# Patient Record
Sex: FEMALE | Race: WHITE | Employment: OTHER | URBAN - METROPOLITAN AREA
[De-identification: names, ages, dates, MRNs, and addresses within clinical notes are randomized per-mention and may not be internally consistent; named-entity substitution may affect disease eponyms.]

---

## 2017-02-14 RX ORDER — TRIAMTERENE AND HYDROCHLOROTHIAZIDE 37.5; 25 MG/1; MG/1
TABLET ORAL
Qty: 90 TABLET | Refills: 3 | Status: SHIPPED | OUTPATIENT
Start: 2017-02-14 | End: 2018-03-23

## 2017-03-27 ENCOUNTER — OFFICE VISIT (OUTPATIENT)
Dept: INTERNAL MEDICINE CLINIC | Facility: CLINIC | Age: 67
End: 2017-03-27

## 2017-03-27 VITALS
WEIGHT: 167 LBS | DIASTOLIC BLOOD PRESSURE: 68 MMHG | SYSTOLIC BLOOD PRESSURE: 114 MMHG | TEMPERATURE: 98 F | BODY MASS INDEX: 31.94 KG/M2 | HEIGHT: 60.5 IN | HEART RATE: 64 BPM

## 2017-03-27 DIAGNOSIS — Z12.31 VISIT FOR SCREENING MAMMOGRAM: ICD-10-CM

## 2017-03-27 DIAGNOSIS — I10 ESSENTIAL HYPERTENSION: ICD-10-CM

## 2017-03-27 DIAGNOSIS — Z00.00 ANNUAL PHYSICAL EXAM: Primary | ICD-10-CM

## 2017-03-27 DIAGNOSIS — B35.1 FUNGAL INFECTION OF NAIL: ICD-10-CM

## 2017-03-27 PROCEDURE — 99396 PREV VISIT EST AGE 40-64: CPT | Performed by: INTERNAL MEDICINE

## 2017-03-27 NOTE — PROGRESS NOTES
Jez Kumar is a 61year old female who presents for     Annual physical and 7 month check    Feels good. Did a half marathon last year--run/walk combination. HTN:    Not checking home BPs. Tolerating Maxzide 37.5/25 mg daily.      Asks check o wheezing or dyspnea  Cardiac: No chest pain or palpitations  Gastrointestinal: No abdominal pain, vomiting, diarrhea or constipation, no blood in stool. Genitourinary:  No dysuria or blood in the urine  Dermatologic:  No rash or itching.   Thressa Fillers stool hemoccults to return.     Lab 8/9/16- cbc cmp tsh ua -results ok.  Lipids and Vit D were nl in 7/15. Ret in 6 months with cbc cmp tsh ua with reflex to culture. Patient expresses understanding of above issues and plan.          Current Outpatien

## 2017-03-28 ENCOUNTER — TELEPHONE (OUTPATIENT)
Dept: INTERNAL MEDICINE CLINIC | Facility: CLINIC | Age: 67
End: 2017-03-28

## 2017-03-28 NOTE — TELEPHONE ENCOUNTER
Erin faxed a PA for: Ciclopirox ph. # [phone]   Fax.  # [phone]    Placed in [address] [place] [address] folder   Routed to Rx

## 2017-04-03 NOTE — TELEPHONE ENCOUNTER
DENIAL rec'd for Ciclopirox  Form sent to be scanned (rec'd only 6 of 8 pages of the fax)  Tasked to [employer]

## 2017-08-08 PROCEDURE — 82270 OCCULT BLOOD FECES: CPT

## 2017-08-09 ENCOUNTER — TELEPHONE (OUTPATIENT)
Dept: INTERNAL MEDICINE CLINIC | Facility: CLINIC | Age: 67
End: 2017-08-09

## 2017-08-09 ENCOUNTER — APPOINTMENT (OUTPATIENT)
Dept: LAB | Age: 67
End: 2017-08-09
Attending: INTERNAL MEDICINE
Payer: COMMERCIAL

## 2017-08-09 DIAGNOSIS — Z00.00 ANNUAL PHYSICAL EXAM: ICD-10-CM

## 2017-08-09 LAB
HEMOCCULT STL QL: NEGATIVE

## 2017-08-10 ENCOUNTER — TELEPHONE (OUTPATIENT)
Dept: INTERNAL MEDICINE CLINIC | Facility: CLINIC | Age: 67
End: 2017-08-10

## 2017-08-10 NOTE — TELEPHONE ENCOUNTER
To nursing, please tell pt --results of stool hemoccults x3 are neg. (normal). (dated 8/8/17)  Thanks.

## 2017-08-16 ENCOUNTER — LAB ENCOUNTER (OUTPATIENT)
Dept: LAB | Facility: HOSPITAL | Age: 67
End: 2017-08-16
Attending: INTERNAL MEDICINE
Payer: COMMERCIAL

## 2017-08-16 ENCOUNTER — TELEPHONE (OUTPATIENT)
Dept: INTERNAL MEDICINE CLINIC | Facility: CLINIC | Age: 67
End: 2017-08-16

## 2017-08-16 DIAGNOSIS — Z00.00 ANNUAL PHYSICAL EXAM: ICD-10-CM

## 2017-08-16 DIAGNOSIS — I10 ESSENTIAL HYPERTENSION: ICD-10-CM

## 2017-08-16 LAB
ALBUMIN SERPL BCP-MCNC: 4.1 G/DL (ref 3.5–4.8)
ALBUMIN/GLOB SERPL: 1.5 {RATIO} (ref 1–2)
ALP SERPL-CCNC: 72 U/L (ref 32–100)
ALT SERPL-CCNC: 17 U/L (ref 14–54)
ANION GAP SERPL CALC-SCNC: 8 MMOL/L (ref 0–18)
AST SERPL-CCNC: 24 U/L (ref 15–41)
BASOPHILS # BLD: 0 K/UL (ref 0–0.2)
BASOPHILS NFR BLD: 1 %
BILIRUB SERPL-MCNC: 1.1 MG/DL (ref 0.3–1.2)
BILIRUB UR QL: NEGATIVE
BUN SERPL-MCNC: 16 MG/DL (ref 8–20)
BUN/CREAT SERPL: 17.8 (ref 10–20)
CALCIUM SERPL-MCNC: 9.7 MG/DL (ref 8.5–10.5)
CHLORIDE SERPL-SCNC: 103 MMOL/L (ref 95–110)
CO2 SERPL-SCNC: 29 MMOL/L (ref 22–32)
COLOR UR: YELLOW
CREAT SERPL-MCNC: 0.9 MG/DL (ref 0.5–1.5)
EOSINOPHIL # BLD: 0.1 K/UL (ref 0–0.7)
EOSINOPHIL NFR BLD: 4 %
ERYTHROCYTE [DISTWIDTH] IN BLOOD BY AUTOMATED COUNT: 13.4 % (ref 11–15)
GLOBULIN PLAS-MCNC: 2.8 G/DL (ref 2.5–3.7)
GLUCOSE SERPL-MCNC: 99 MG/DL (ref 70–99)
GLUCOSE UR-MCNC: NEGATIVE MG/DL
HCT VFR BLD AUTO: 40.9 % (ref 35–48)
HGB BLD-MCNC: 13.6 G/DL (ref 12–16)
HGB UR QL STRIP.AUTO: NEGATIVE
KETONES UR-MCNC: NEGATIVE MG/DL
LEUKOCYTE ESTERASE UR QL STRIP.AUTO: NEGATIVE
LYMPHOCYTES # BLD: 1.2 K/UL (ref 1–4)
LYMPHOCYTES NFR BLD: 31 %
MCH RBC QN AUTO: 31.4 PG (ref 27–32)
MCHC RBC AUTO-ENTMCNC: 33.2 G/DL (ref 32–37)
MCV RBC AUTO: 94.7 FL (ref 80–100)
MONOCYTES # BLD: 0.4 K/UL (ref 0–1)
MONOCYTES NFR BLD: 10 %
NEUTROPHILS # BLD AUTO: 2.2 K/UL (ref 1.8–7.7)
NEUTROPHILS NFR BLD: 55 %
NITRITE UR QL STRIP.AUTO: NEGATIVE
OSMOLALITY UR CALC.SUM OF ELEC: 291 MOSM/KG (ref 275–295)
PH UR: 5 [PH] (ref 5–8)
PLATELET # BLD AUTO: 253 K/UL (ref 140–400)
PMV BLD AUTO: 7.3 FL (ref 7.4–10.3)
POTASSIUM SERPL-SCNC: 3.5 MMOL/L (ref 3.3–5.1)
PROT SERPL-MCNC: 6.9 G/DL (ref 5.9–8.4)
PROT UR-MCNC: NEGATIVE MG/DL
RBC # BLD AUTO: 4.32 M/UL (ref 3.7–5.4)
SODIUM SERPL-SCNC: 140 MMOL/L (ref 136–144)
SP GR UR STRIP: 1.02 (ref 1–1.03)
TSH SERPL-ACNC: 2.85 UIU/ML (ref 0.45–5.33)
UROBILINOGEN UR STRIP-ACNC: <2
VIT C UR-MCNC: NEGATIVE MG/DL
WBC # BLD AUTO: 4 K/UL (ref 4–11)

## 2017-08-16 PROCEDURE — 81003 URINALYSIS AUTO W/O SCOPE: CPT | Performed by: INTERNAL MEDICINE

## 2017-08-16 PROCEDURE — 84443 ASSAY THYROID STIM HORMONE: CPT

## 2017-08-16 PROCEDURE — 85025 COMPLETE CBC W/AUTO DIFF WBC: CPT

## 2017-08-16 PROCEDURE — 36415 COLL VENOUS BLD VENIPUNCTURE: CPT

## 2017-08-16 PROCEDURE — 80053 COMPREHEN METABOLIC PANEL: CPT

## 2017-08-16 NOTE — TELEPHONE ENCOUNTER
Nursing, please tell patient lab done on 8/16/17–CBC CMP TSH UA–results are okay. See me 9/25/17 as planned. Thanks.

## 2017-08-21 ENCOUNTER — TELEPHONE (OUTPATIENT)
Dept: INTERNAL MEDICINE CLINIC | Facility: CLINIC | Age: 67
End: 2017-08-21

## 2017-08-21 ENCOUNTER — OFFICE VISIT (OUTPATIENT)
Dept: INTERNAL MEDICINE CLINIC | Facility: CLINIC | Age: 67
End: 2017-08-21

## 2017-08-21 VITALS
SYSTOLIC BLOOD PRESSURE: 136 MMHG | HEART RATE: 54 BPM | BODY MASS INDEX: 31.14 KG/M2 | OXYGEN SATURATION: 98 % | WEIGHT: 162.81 LBS | HEIGHT: 60.5 IN | DIASTOLIC BLOOD PRESSURE: 76 MMHG | TEMPERATURE: 99 F

## 2017-08-21 DIAGNOSIS — M79.672 LEFT FOOT PAIN: Primary | ICD-10-CM

## 2017-08-21 PROCEDURE — 99212 OFFICE O/P EST SF 10 MIN: CPT | Performed by: INTERNAL MEDICINE

## 2017-08-21 PROCEDURE — 99213 OFFICE O/P EST LOW 20 MIN: CPT | Performed by: INTERNAL MEDICINE

## 2017-08-21 NOTE — TELEPHONE ENCOUNTER
To managed care, I gave this pt a referral to podiatrist Dr. Gary Villegas for L foot pain. Can you please verify she is in her HMO plan so we can let pt know. Thanks.

## 2017-08-21 NOTE — PROGRESS NOTES
Oleg Kumari is a 61year old female who presents for     L foot problem. Pain in medial L heel when runs 8 miles. \"rammed\" the foot of ottoman betw toes #3 and 4 L foot 2 1/2 wks ago. Some pain base of 4th toe. Although is better.  Also felt vibra Right arm, Patient Position: Sitting, Cuff Size: adult)   Pulse 54   Temp 98.7 °F (37.1 °C) (Oral)   Ht 5' 0.5\" (1.537 m)   Wt 162 lb 12.8 oz (73.8 kg)   SpO2 98%   BMI 31.27 kg/m²       Wt Readings from Last 6 Encounters:  08/21/17 : 162 lb 12.8 oz (73.8

## 2017-08-22 NOTE — TELEPHONE ENCOUNTER
Ryan Wallis from [facility] called - pt has an appt on 8/24, and does need a referral. Please fax to F: [phone]

## 2017-08-31 ENCOUNTER — TELEPHONE (OUTPATIENT)
Dept: INTERNAL MEDICINE CLINIC | Facility: CLINIC | Age: 67
End: 2017-08-31

## 2017-08-31 DIAGNOSIS — M79.673 PAIN OF FOOT, UNSPECIFIED LATERALITY: Primary | ICD-10-CM

## 2017-08-31 NOTE — TELEPHONE ENCOUNTER
[phone]  Pt need additional referrals for Dr Orin Smith.  Pt has seen her 2 times already and has 1 left  To clinical

## 2017-08-31 NOTE — TELEPHONE ENCOUNTER
Referral placed. Patient notified of referral placement and that she will hear back from [facility] with authorization.

## 2017-09-06 ENCOUNTER — TELEPHONE (OUTPATIENT)
Dept: INTERNAL MEDICINE CLINIC | Facility: CLINIC | Age: 67
End: 2017-09-06

## 2017-09-06 DIAGNOSIS — L60.3 DYSTROPHIA UNGUIUM: Primary | ICD-10-CM

## 2017-09-06 NOTE — TELEPHONE ENCOUNTER
Needs referral for toe nail biopsy to be done by Dr hCasity Armendariz    Procedure code is  58477 -  Diagnosis code is  L60.3    [facility] @ Dr Johnathan Murillo office can be reached at   [phone] X [phone] - fax# [phone]

## 2017-09-06 NOTE — TELEPHONE ENCOUNTER
Spoke with Dr. Conrad Donnelly offce([facility]) explained it is there office responsibility to obtain prior authorization for procedures or surgery performed by Dr. Conrad Donnelly per [facility] contract. Referral will be canceled. Thank you, [facility].

## 2017-09-06 NOTE — TELEPHONE ENCOUNTER
I note managed care canceled toenail biopsy referral and notifed Dr. Nathalie Guardado office that they are required to do their own referrals for procedures.

## 2017-09-06 NOTE — TELEPHONE ENCOUNTER
To nursing, I signed referral for toenail bx Dr. Brad Smith. Dx dystrophia unguinum. 3 visits. Thanks.

## 2017-09-08 ENCOUNTER — HOSPITAL ENCOUNTER (OUTPATIENT)
Dept: MAMMOGRAPHY | Facility: HOSPITAL | Age: 67
Discharge: HOME | End: 2017-09-08
Attending: INTERNAL MEDICINE
Payer: COMMERCIAL

## 2017-09-08 DIAGNOSIS — Z12.31 VISIT FOR SCREENING MAMMOGRAM: ICD-10-CM

## 2017-09-08 PROCEDURE — 77067 SCR MAMMO BI INCL CAD: CPT | Performed by: INTERNAL MEDICINE

## 2017-09-25 ENCOUNTER — OFFICE VISIT (OUTPATIENT)
Dept: INTERNAL MEDICINE CLINIC | Facility: CLINIC | Age: 67
End: 2017-09-25

## 2017-09-25 VITALS
HEIGHT: 64 IN | BODY MASS INDEX: 27.66 KG/M2 | HEART RATE: 54 BPM | TEMPERATURE: 98 F | OXYGEN SATURATION: 99 % | SYSTOLIC BLOOD PRESSURE: 116 MMHG | DIASTOLIC BLOOD PRESSURE: 84 MMHG | WEIGHT: 162 LBS

## 2017-09-25 DIAGNOSIS — I10 ESSENTIAL HYPERTENSION: Primary | ICD-10-CM

## 2017-09-25 DIAGNOSIS — M85.80 OSTEOPENIA DETERMINED BY X-RAY: ICD-10-CM

## 2017-09-25 DIAGNOSIS — B35.1 FUNGAL INFECTION OF NAIL: ICD-10-CM

## 2017-09-25 PROCEDURE — 99212 OFFICE O/P EST SF 10 MIN: CPT | Performed by: INTERNAL MEDICINE

## 2017-09-25 PROCEDURE — 99214 OFFICE O/P EST MOD 30 MIN: CPT | Performed by: INTERNAL MEDICINE

## 2017-09-25 NOTE — PROGRESS NOTES
Philippe Mccullough is a 61year old female who presents for     6 mo check    Feels good. Is training for [address] [place] [address] to be 10/8/17. Saw Dr. Matute and to get orthotics --on order now. L foot pain is better. Able to train--ran 20 mi last wk end.     dyspnea  Cardiac: No chest pain or palpitations  Gastrointestinal: No abdominal pain, vomiting, diarrhea or constipation  Genitourinary:  No dysuria or blood in the urine  Dermatologic:  No rash or itching.       EXAM:   /84 (BP Location: Right arm, P issues and plan. Current Outpatient Prescriptions:  TRIAMTERENE-HCTZ 37.5-25 MG Oral Tab TAKE ONE TABLET BY MOUTH EVERY DAY Disp: 90 tablet Rfl: 3   Calcium Carbonate-Vitamin D (CALCIUM + D OR) Take 1 tablet by mouth daily.  Disp:  Rfl:          [address]

## 2017-10-03 ENCOUNTER — TELEPHONE (OUTPATIENT)
Dept: INTERNAL MEDICINE CLINIC | Facility: CLINIC | Age: 67
End: 2017-10-03

## 2017-10-03 NOTE — TELEPHONE ENCOUNTER
Pt needs the orders from 8/31 and 9/6 (11 visits total) for Dr. Karmen Beard, faxed to their office.       Tasked to Nursing

## 2017-12-11 ENCOUNTER — NURSE ONLY (OUTPATIENT)
Dept: INTERNAL MEDICINE CLINIC | Facility: CLINIC | Age: 67
End: 2017-12-11

## 2017-12-11 DIAGNOSIS — Z23 NEED FOR INFLUENZA VACCINATION: Primary | ICD-10-CM

## 2017-12-11 PROCEDURE — 90471 IMMUNIZATION ADMIN: CPT | Performed by: INTERNAL MEDICINE

## 2017-12-11 PROCEDURE — 90686 IIV4 VACC NO PRSV 0.5 ML IM: CPT | Performed by: INTERNAL MEDICINE

## 2017-12-11 NOTE — PROGRESS NOTES
Patient presents for Flu vaccine. Confirmed personal information with patient.  Verified reason of visit

## 2017-12-19 ENCOUNTER — OFFICE VISIT (OUTPATIENT)
Dept: INTERNAL MEDICINE CLINIC | Facility: CLINIC | Age: 67
End: 2017-12-19

## 2017-12-19 ENCOUNTER — TELEPHONE (OUTPATIENT)
Dept: INTERNAL MEDICINE CLINIC | Facility: CLINIC | Age: 67
End: 2017-12-19

## 2017-12-19 ENCOUNTER — HOSPITAL ENCOUNTER (OUTPATIENT)
Dept: GENERAL RADIOLOGY | Age: 67
Discharge: HOME | End: 2017-12-19
Attending: INTERNAL MEDICINE | Admitting: INTERNAL MEDICINE
Payer: MEDICARE

## 2017-12-19 VITALS
HEIGHT: 64 IN | HEART RATE: 60 BPM | SYSTOLIC BLOOD PRESSURE: 130 MMHG | BODY MASS INDEX: 28.17 KG/M2 | TEMPERATURE: 99 F | DIASTOLIC BLOOD PRESSURE: 80 MMHG | WEIGHT: 165 LBS

## 2017-12-19 DIAGNOSIS — M25.561 ACUTE PAIN OF RIGHT KNEE: Primary | ICD-10-CM

## 2017-12-19 DIAGNOSIS — M25.561 ACUTE PAIN OF RIGHT KNEE: ICD-10-CM

## 2017-12-19 PROCEDURE — 73560 X-RAY EXAM OF KNEE 1 OR 2: CPT | Performed by: INTERNAL MEDICINE

## 2017-12-19 PROCEDURE — G0463 HOSPITAL OUTPT CLINIC VISIT: HCPCS | Performed by: INTERNAL MEDICINE

## 2017-12-19 PROCEDURE — 99213 OFFICE O/P EST LOW 20 MIN: CPT | Performed by: INTERNAL MEDICINE

## 2017-12-19 NOTE — TELEPHONE ENCOUNTER
To nursing, please tell pt xray R knee xray today shows some narrowing of the joint space in central R knee and some fluid in the knee. Joint space narrowing can sometimes be seen in early arthritis.   Go ahead and see the orthopedic Dr. Odette Lancaster as we dis

## 2017-12-19 NOTE — PROGRESS NOTES
Konstantin Jere is a 74year old female who presents for     R knee pain  When running 2 wks ago felt a pulling in medial R knee. Aches in anterior R knee going down the stairs. No locking or giving out. Not needed to take advil or OTC pain med.    Saw  (37.2 °C) (Oral)   Ht 5' 4\" (1.626 m)   Wt 165 lb (74.8 kg)   BMI 28.32 kg/m²       Wt Readings from Last 6 Encounters:  12/19/17 : 165 lb (74.8 kg)  09/25/17 : 162 lb (73.5 kg)  08/21/17 : 162 lb 12.8 oz (73.8 kg)  03/27/17 : 167 lb (75.8 kg)  08/16/16 :

## 2017-12-19 NOTE — TELEPHONE ENCOUNTER
Patient had her x-rays done today downstairs. She has an appointment with Dr. Danae Gonzales on 12/26.     Patient needs a referral.

## 2018-01-15 ENCOUNTER — TELEPHONE (OUTPATIENT)
Dept: INTERNAL MEDICINE CLINIC | Facility: CLINIC | Age: 68
End: 2018-01-15

## 2018-01-15 NOTE — TELEPHONE ENCOUNTER
Pt. Is going on a mission trip and wants to know when was her last Tetanus shot  Ph. # [phone]   Routed to clinical

## 2018-03-23 RX ORDER — TRIAMTERENE AND HYDROCHLOROTHIAZIDE 37.5; 25 MG/1; MG/1
TABLET ORAL
Qty: 90 TABLET | Refills: 3 | Status: SHIPPED | OUTPATIENT
Start: 2018-03-23 | End: 2018-10-22

## 2018-04-03 ENCOUNTER — OFFICE VISIT (OUTPATIENT)
Dept: INTERNAL MEDICINE CLINIC | Facility: CLINIC | Age: 68
End: 2018-04-03

## 2018-04-03 VITALS
WEIGHT: 163 LBS | TEMPERATURE: 98 F | HEIGHT: 64 IN | DIASTOLIC BLOOD PRESSURE: 76 MMHG | SYSTOLIC BLOOD PRESSURE: 120 MMHG | OXYGEN SATURATION: 98 % | HEART RATE: 55 BPM | BODY MASS INDEX: 27.83 KG/M2

## 2018-04-03 DIAGNOSIS — Z85.3 HISTORY OF BREAST CANCER: ICD-10-CM

## 2018-04-03 DIAGNOSIS — I10 ESSENTIAL HYPERTENSION: Primary | ICD-10-CM

## 2018-04-03 DIAGNOSIS — M17.11 PRIMARY OSTEOARTHRITIS OF RIGHT KNEE: ICD-10-CM

## 2018-04-03 PROCEDURE — G0009 ADMIN PNEUMOCOCCAL VACCINE: HCPCS | Performed by: INTERNAL MEDICINE

## 2018-04-03 PROCEDURE — 90670 PCV13 VACCINE IM: CPT | Performed by: INTERNAL MEDICINE

## 2018-04-03 PROCEDURE — 99214 OFFICE O/P EST MOD 30 MIN: CPT | Performed by: INTERNAL MEDICINE

## 2018-04-03 PROCEDURE — G0463 HOSPITAL OUTPT CLINIC VISIT: HCPCS | Performed by: INTERNAL MEDICINE

## 2018-04-03 NOTE — PROGRESS NOTES
Naty Xiong is a 74year old female who presents for     6 mo check      Feels good. Was in [facility] for 2 mo-returned 1 mo ago. R knee pain of 12/19/17 visit is better. 12/19/17-xray R knee--narrowing of medial joint space and small joint effusion. systems:  Constitutional:  No fever, loss of appetite or unintentional weight loss  Respiratory: No cough, wheezing or dyspnea  Cardiac: No chest pain or palpitations  Gastrointestinal: No abdominal pain, vomiting, diarrhea or constipation  Dermatologic: ok.     Ret in 6 mo with cbc cmp tsh lipid ua w reflex. Medicare annual then.      Patient expresses understanding of above issues and plan.        - CBC WITH DIFFERENTIAL WITH PLATELET; Future  - COMP METABOLIC PANEL (14); Future  - LIPID PANEL;  Future  -

## 2018-09-10 ENCOUNTER — OFFICE VISIT (OUTPATIENT)
Dept: INTERNAL MEDICINE CLINIC | Facility: CLINIC | Age: 68
End: 2018-09-10
Payer: MEDICARE

## 2018-09-10 ENCOUNTER — TELEPHONE (OUTPATIENT)
Dept: INTERNAL MEDICINE CLINIC | Facility: CLINIC | Age: 68
End: 2018-09-10

## 2018-09-10 VITALS
WEIGHT: 152.5 LBS | TEMPERATURE: 98 F | HEIGHT: 65 IN | BODY MASS INDEX: 25.41 KG/M2 | SYSTOLIC BLOOD PRESSURE: 110 MMHG | HEART RATE: 49 BPM | DIASTOLIC BLOOD PRESSURE: 76 MMHG | OXYGEN SATURATION: 99 %

## 2018-09-10 DIAGNOSIS — H57.89 PERIORBITAL SWELLING: Primary | ICD-10-CM

## 2018-09-10 PROCEDURE — G0463 HOSPITAL OUTPT CLINIC VISIT: HCPCS | Performed by: INTERNAL MEDICINE

## 2018-09-10 PROCEDURE — 99213 OFFICE O/P EST LOW 20 MIN: CPT | Performed by: INTERNAL MEDICINE

## 2018-09-10 RX ORDER — METHYLPREDNISOLONE 4 MG/1
TABLET ORAL
Qty: 1 KIT | Refills: 0 | Status: SHIPPED | OUTPATIENT
Start: 2018-09-10 | End: 2018-10-22 | Stop reason: ALTCHOICE

## 2018-09-10 NOTE — TELEPHONE ENCOUNTER
Called patient who wanted to know how her Medrol dose pack - she read then to take 3 tablets now and 3 before bedtime

## 2018-09-10 NOTE — PROGRESS NOTES
Oleg Kumari is a 74year old female who presents for     R eye swelling. Woke up yesterday with swelling under R eye. pt feels this is related to an insect bite near her R eye. Applied warm compresses -a hot paper towel--yest and today.    \"It h status: Never Smoker      Smokeless tobacco: Never Used    Alcohol use: Yes      Comment: wine, rarely     Drug use: No         Allergies:  No Known Allergies    Review of systems:  Constitutional:  Has loss of wt w wt watchers.    Heart rate today is 49-pt

## 2018-09-17 ENCOUNTER — TELEPHONE (OUTPATIENT)
Dept: INTERNAL MEDICINE CLINIC | Facility: CLINIC | Age: 68
End: 2018-09-17

## 2018-09-17 DIAGNOSIS — Z12.31 VISIT FOR SCREENING MAMMOGRAM: Primary | ICD-10-CM

## 2018-10-18 ENCOUNTER — HOSPITAL ENCOUNTER (OUTPATIENT)
Dept: MAMMOGRAPHY | Facility: HOSPITAL | Age: 68
Discharge: HOME | End: 2018-10-18
Attending: INTERNAL MEDICINE
Payer: COMMERCIAL

## 2018-10-18 DIAGNOSIS — Z12.31 VISIT FOR SCREENING MAMMOGRAM: ICD-10-CM

## 2018-10-18 PROCEDURE — 77067 SCR MAMMO BI INCL CAD: CPT | Performed by: INTERNAL MEDICINE

## 2018-10-18 PROCEDURE — 77063 BREAST TOMOSYNTHESIS BI: CPT | Performed by: INTERNAL MEDICINE

## 2018-10-19 ENCOUNTER — LAB ENCOUNTER (OUTPATIENT)
Dept: LAB | Facility: HOSPITAL | Age: 68
End: 2018-10-19
Attending: INTERNAL MEDICINE
Payer: COMMERCIAL

## 2018-10-19 DIAGNOSIS — I10 ESSENTIAL HYPERTENSION: ICD-10-CM

## 2018-10-19 PROCEDURE — 80053 COMPREHEN METABOLIC PANEL: CPT

## 2018-10-19 PROCEDURE — 87086 URINE CULTURE/COLONY COUNT: CPT | Performed by: INTERNAL MEDICINE

## 2018-10-19 PROCEDURE — 84443 ASSAY THYROID STIM HORMONE: CPT

## 2018-10-19 PROCEDURE — 81001 URINALYSIS AUTO W/SCOPE: CPT | Performed by: INTERNAL MEDICINE

## 2018-10-19 PROCEDURE — 36415 COLL VENOUS BLD VENIPUNCTURE: CPT

## 2018-10-19 PROCEDURE — 85025 COMPLETE CBC W/AUTO DIFF WBC: CPT

## 2018-10-19 PROCEDURE — 80061 LIPID PANEL: CPT

## 2018-10-22 ENCOUNTER — APPOINTMENT (OUTPATIENT)
Dept: LAB | Age: 68
End: 2018-10-22
Attending: INTERNAL MEDICINE
Payer: COMMERCIAL

## 2018-10-22 ENCOUNTER — OFFICE VISIT (OUTPATIENT)
Dept: INTERNAL MEDICINE CLINIC | Facility: CLINIC | Age: 68
End: 2018-10-22
Payer: MEDICARE

## 2018-10-22 VITALS
WEIGHT: 152 LBS | TEMPERATURE: 98 F | SYSTOLIC BLOOD PRESSURE: 130 MMHG | DIASTOLIC BLOOD PRESSURE: 80 MMHG | HEART RATE: 57 BPM | HEIGHT: 66 IN | OXYGEN SATURATION: 100 % | BODY MASS INDEX: 24.43 KG/M2

## 2018-10-22 DIAGNOSIS — K62.5 RECTAL BLEEDING: ICD-10-CM

## 2018-10-22 DIAGNOSIS — I10 ESSENTIAL HYPERTENSION: ICD-10-CM

## 2018-10-22 DIAGNOSIS — R82.90 ABNORMAL URINALYSIS: ICD-10-CM

## 2018-10-22 DIAGNOSIS — Z00.00 WELCOME TO MEDICARE PREVENTIVE VISIT: Primary | ICD-10-CM

## 2018-10-22 DIAGNOSIS — Z85.3 HISTORY OF BREAST CANCER: ICD-10-CM

## 2018-10-22 DIAGNOSIS — L98.9 SKIN LESION: ICD-10-CM

## 2018-10-22 DIAGNOSIS — M17.11 PRIMARY OSTEOARTHRITIS OF RIGHT KNEE: ICD-10-CM

## 2018-10-22 PROCEDURE — 99213 OFFICE O/P EST LOW 20 MIN: CPT | Performed by: INTERNAL MEDICINE

## 2018-10-22 PROCEDURE — 81003 URINALYSIS AUTO W/O SCOPE: CPT | Performed by: INTERNAL MEDICINE

## 2018-10-22 PROCEDURE — 90653 IIV ADJUVANT VACCINE IM: CPT | Performed by: INTERNAL MEDICINE

## 2018-10-22 PROCEDURE — G0438 PPPS, INITIAL VISIT: HCPCS | Performed by: INTERNAL MEDICINE

## 2018-10-22 PROCEDURE — G0463 HOSPITAL OUTPT CLINIC VISIT: HCPCS | Performed by: INTERNAL MEDICINE

## 2018-10-22 PROCEDURE — G0008 ADMIN INFLUENZA VIRUS VAC: HCPCS | Performed by: INTERNAL MEDICINE

## 2018-10-22 RX ORDER — TRIAMTERENE AND HYDROCHLOROTHIAZIDE 37.5; 25 MG/1; MG/1
1 TABLET ORAL
Qty: 90 TABLET | Refills: 3 | Status: SHIPPED | OUTPATIENT
Start: 2018-10-22 | End: 2020-01-02

## 2018-10-22 NOTE — PROGRESS NOTES
Neo Sandoval is a 74year old female who presents for     6 mo check and welcome to medicare     Feels good. Mel-orbital swelling of 9/10/18 visit resolved w medrol dose pack.    Finished half marathon in [place] in 9/2018.      R knee pain of 12/2017 (cause of death)   • Cancer Brother 64        multiple myeloma   • Other (Other) Brother         Sleep apnea   • Breast Cancer Self         42      Social History:   Social History    Tobacco Use      Smoking status: Never Smoker      Smokeless tobacco: Ne daily.      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 Saw Dr Jyoti Stewart. .      History of breast cancer-Hx of L breast cancer. Mammo ok 9/8/17--pt had mammo isabel 10/18/18--results still pending.  Kyung Every you describe your daily physical activity?: Moderate    How would you describe your current health state?: Good    How do you maintain positive mental well-being?: Visiting Family; Visiting Friends    If you are a male age 38-65 or a female age 47-67, do yo Correct    Recall \"Flag\": Correct    Recall \"Tree\": Correct      Tyrone Brown's SCREENING SCHEDULE   .    PREVENTATIVE SERVICES  INDICATIONS AND SCHEDULE Internal Lab or Procedure External Lab or Procedure   Diabetes Screening      HbgA1C   Annually Hepatitis B No orders found for this or any previous visit. Update Immunization Activity if applicable    Tetanus No orders found for this or any previous visit.  Update Immunization Activity if applicable    Zoster (Not covered by Medicare Part B) No order

## 2018-10-23 ENCOUNTER — TELEPHONE (OUTPATIENT)
Dept: INTERNAL MEDICINE CLINIC | Facility: CLINIC | Age: 68
End: 2018-10-23

## 2018-10-23 DIAGNOSIS — K62.5 RECTAL BLEEDING: Primary | ICD-10-CM

## 2018-10-23 DIAGNOSIS — R82.90 ABNORMAL URINALYSIS: Primary | ICD-10-CM

## 2018-10-23 DIAGNOSIS — R04.2 HEMOPTYSIS: Primary | ICD-10-CM

## 2018-10-23 NOTE — TELEPHONE ENCOUNTER
To nursing, referral reentered is just gastroenterology. I did not know you could enter a referral without the specific doctors name. PIs let patient know. Thanks.     1. Rectal bleeding  - GASTRO - INTERNAL

## 2018-10-23 NOTE — TELEPHONE ENCOUNTER
To Dr. Ramírez Cons - per mammography: Mammography is read, for some reason not crossing over. They are faxing copy now to your attention, Jake Wills will place on your desk when it arrives.

## 2018-10-23 NOTE — TELEPHONE ENCOUNTER
To Dr. Kenisha Cuevas - see below. Spoke with Dr. Leigh Thompson office - new referral needed for \"Gastroenterology\" - if not to a specific physician - they have 16 Drs.

## 2018-10-23 NOTE — TELEPHONE ENCOUNTER
To nursing, please tell patient urinalysis 10/22/18–results are normal.  I would repeat the urinalysis again in 1 month just to be sure there is not an intermittent infection –in view of abnormal urinalysis on 10/19/18. Order entered. Thanks.       - URIN

## 2018-10-23 NOTE — TELEPHONE ENCOUNTER
Pt called to schedule an riley with Dr Marisela Colunga, the referral Dr Jazzmine Newton gave pt said Dr Marisela Colunga or assoc  Pt can be seen until 12/13 she wants to get in sooner, pt is requesting a referral for another assoc, they told pt she needs this from Dr HARPER  Please call pt when c

## 2018-10-23 NOTE — TELEPHONE ENCOUNTER
To nursing, patient had mammogram 10/18/18. The result is still not been interpreted or released. Please call mammography and see if they can expedite this because the patient is asking for results.   [facility] RN called yesterday but still no results have bee

## 2018-10-26 DIAGNOSIS — D22.9 ATYPICAL MOLE: Primary | ICD-10-CM

## 2018-10-26 NOTE — TELEPHONE ENCOUNTER
Noted. Again attempted to reach out to the mammography office; left message requesting report be faxed to Dr. Ricardo Freeman today. Routed to Dr. Judson Solis as an Levell Moulds.

## 2018-10-26 NOTE — TELEPHONE ENCOUNTER
To nursing, please tell patient screening mammogram ([place] 2D + 3D screening) done 10/18/18–results are okay. Thanks. Note to self–Cheryl got the results faxed from [address] is not \"crossover\" in the system for her to be in [facility].   Faxed report is s

## 2018-10-26 NOTE — TELEPHONE ENCOUNTER
Discussed maria esther Cortez she called pt today with mammogram results, pt asked for   1)referral to Derm Dr Sol Rock to check moles--referral entered. 2) gets hard mucus w blood in mouth size of a nickel--happened 3 times in last mo.  Also occurred in 2012--saw

## 2018-10-26 NOTE — TELEPHONE ENCOUNTER
Called patient with Dr. Baudilio Stoner message. Patient also asked for referral to derm for moles and stated she has spit up blood lately.  Dr. Baudilio Stoner ordered CXR and ENT referral.

## 2018-10-31 ENCOUNTER — TELEPHONE (OUTPATIENT)
Dept: INTERNAL MEDICINE CLINIC | Facility: CLINIC | Age: 68
End: 2018-10-31

## 2018-10-31 ENCOUNTER — HOSPITAL ENCOUNTER (OUTPATIENT)
Dept: GENERAL RADIOLOGY | Age: 68
Discharge: HOME | End: 2018-10-31
Attending: INTERNAL MEDICINE
Payer: COMMERCIAL

## 2018-10-31 DIAGNOSIS — R04.2 HEMOPTYSIS: ICD-10-CM

## 2018-10-31 PROCEDURE — 71046 X-RAY EXAM CHEST 2 VIEWS: CPT | Performed by: INTERNAL MEDICINE

## 2018-10-31 NOTE — TELEPHONE ENCOUNTER
To nursing, please tell patient chest x-ray 10/31/18–results are okay. Go ahead and see Dr. Mora Menchaca tomorrow as planned for the material with blood that she has spit out. Thanks.

## 2018-11-01 ENCOUNTER — OFFICE VISIT (OUTPATIENT)
Dept: OTOLARYNGOLOGY | Facility: CLINIC | Age: 68
End: 2018-11-01
Payer: MEDICARE

## 2018-11-01 VITALS
HEIGHT: 65.5 IN | BODY MASS INDEX: 24.69 KG/M2 | DIASTOLIC BLOOD PRESSURE: 82 MMHG | TEMPERATURE: 99 F | WEIGHT: 150 LBS | SYSTOLIC BLOOD PRESSURE: 129 MMHG

## 2018-11-01 DIAGNOSIS — R04.2 HEMOPTYSIS: Primary | ICD-10-CM

## 2018-11-01 PROCEDURE — 31575 DIAGNOSTIC LARYNGOSCOPY: CPT | Performed by: OTOLARYNGOLOGY

## 2018-11-01 PROCEDURE — 99203 OFFICE O/P NEW LOW 30 MIN: CPT | Performed by: OTOLARYNGOLOGY

## 2018-11-01 NOTE — PROGRESS NOTES
Oleg Kumari is a 79year old female. Patient presents with:  Throat Problem: pt noticed red and yellow discharge from throat, 4 times in the last 1.5 months     HPI:   She experiences episodes of material light mucus with a spot of redness within it. Normal Overall appearance - Normal.   Head/Face Normal Facial features - Normal. Eyebrows - Normal. Skull - Normal.   Oral/Oropharynx Normal Lips - Normal, Tonsils - Normal, Tongue - Normal    Nasal Normal External nose - Normal. Nasal septum - Normal, Tur appearance. There is no airway obstruction. General comments:     ASSESSMENT AND PLAN:   1. Hemoptysis  She has been experiencing some hemoptysis. I do not see anything in the hypopharynx or larynx but there is an area of irritation in the nasopharynx.

## 2018-12-20 ENCOUNTER — TELEPHONE (OUTPATIENT)
Dept: INTERNAL MEDICINE CLINIC | Facility: CLINIC | Age: 68
End: 2018-12-20

## 2018-12-20 ENCOUNTER — APPOINTMENT (OUTPATIENT)
Dept: LAB | Facility: HOSPITAL | Age: 68
End: 2018-12-20
Attending: INTERNAL MEDICINE
Payer: MEDICARE

## 2018-12-20 PROCEDURE — 81003 URINALYSIS AUTO W/O SCOPE: CPT | Performed by: INTERNAL MEDICINE

## 2019-05-15 ENCOUNTER — OFFICE VISIT (OUTPATIENT)
Dept: INTERNAL MEDICINE CLINIC | Facility: CLINIC | Age: 69
End: 2019-05-15
Payer: COMMERCIAL

## 2019-05-15 VITALS
HEIGHT: 66 IN | DIASTOLIC BLOOD PRESSURE: 72 MMHG | HEART RATE: 60 BPM | WEIGHT: 152 LBS | BODY MASS INDEX: 24.43 KG/M2 | SYSTOLIC BLOOD PRESSURE: 132 MMHG | TEMPERATURE: 99 F

## 2019-05-15 DIAGNOSIS — R04.2 HEMOPTYSIS: ICD-10-CM

## 2019-05-15 DIAGNOSIS — I10 ESSENTIAL HYPERTENSION: Primary | ICD-10-CM

## 2019-05-15 DIAGNOSIS — Z85.3 HISTORY OF BREAST CANCER: ICD-10-CM

## 2019-05-15 PROCEDURE — 99214 OFFICE O/P EST MOD 30 MIN: CPT | Performed by: INTERNAL MEDICINE

## 2019-05-15 PROCEDURE — G0463 HOSPITAL OUTPT CLINIC VISIT: HCPCS | Performed by: INTERNAL MEDICINE

## 2019-05-15 NOTE — PROGRESS NOTES
Griffin Lang is a 79year old female who presents for     6 mo check      Feels good.   Returned 2 wks ago from winter in [place]. Since back from [place], feels allergies sx, stuffy nose, sneezing. She will try flonase. HTN: Not checking home BPs.  Toleratin LUMPECTOMY LEFT     • RADIATION LEFT        Family History   Problem Relation Age of Onset   • Alcohol and Other Disorders Associated Father         Alcoholism   • Cancer Father         Cancer-throat-- age 64 (cause of death)   • Depression Fat for f/u as he recom.  Pt expressed understanding.       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 Saw Dr Victoria Aragon.     History L breast cancer-mammo isabel 10/18/18--ok.     Osteopenia--Wt bearing exercises an

## 2019-08-09 ENCOUNTER — OFFICE VISIT (OUTPATIENT)
Dept: INTERNAL MEDICINE CLINIC | Facility: CLINIC | Age: 69
End: 2019-08-09
Payer: COMMERCIAL

## 2019-08-09 VITALS
TEMPERATURE: 98 F | BODY MASS INDEX: 25.07 KG/M2 | SYSTOLIC BLOOD PRESSURE: 126 MMHG | HEART RATE: 60 BPM | HEIGHT: 66 IN | DIASTOLIC BLOOD PRESSURE: 76 MMHG | WEIGHT: 156 LBS

## 2019-08-09 DIAGNOSIS — J39.2 THROAT IRRITATION: ICD-10-CM

## 2019-08-09 DIAGNOSIS — Z12.31 VISIT FOR SCREENING MAMMOGRAM: Primary | ICD-10-CM

## 2019-08-09 PROCEDURE — G0463 HOSPITAL OUTPT CLINIC VISIT: HCPCS | Performed by: INTERNAL MEDICINE

## 2019-08-09 PROCEDURE — 99213 OFFICE O/P EST LOW 20 MIN: CPT | Performed by: INTERNAL MEDICINE

## 2019-08-09 NOTE — PROGRESS NOTES
Baron Hernandez is a 79year old female who presents with     Sore throat    Onset: 4 days ago  Started with: scratchy sore throat. \"crud in my tonsils with stones all over them. \"  sneezing  Associated symptoms: no nasal drainage, has post nasal drip, Alcohol use: Yes      Comment: wine, rarely     Drug use: No         Allergies:  No Known Allergies    EXAM:   /76   Pulse 60   Temp 98.3 °F (36.8 °C) (Oral)   Ht 5' 6\" (1.676 m)   Wt 156 lb (70.8 kg)   BMI 25.18 kg/m²   Wt Readings from Last 6 [place]

## 2019-08-10 ENCOUNTER — OFFICE VISIT (OUTPATIENT)
Dept: OTOLARYNGOLOGY | Facility: CLINIC | Age: 69
End: 2019-08-10
Payer: COMMERCIAL

## 2019-08-10 VITALS
BODY MASS INDEX: 25.07 KG/M2 | SYSTOLIC BLOOD PRESSURE: 128 MMHG | WEIGHT: 156 LBS | TEMPERATURE: 98 F | DIASTOLIC BLOOD PRESSURE: 70 MMHG | HEIGHT: 66 IN

## 2019-08-10 DIAGNOSIS — R04.2 HEMOPTYSIS: Primary | ICD-10-CM

## 2019-08-10 PROCEDURE — 99214 OFFICE O/P EST MOD 30 MIN: CPT | Performed by: OTOLARYNGOLOGY

## 2019-08-10 PROCEDURE — 31575 DIAGNOSTIC LARYNGOSCOPY: CPT | Performed by: OTOLARYNGOLOGY

## 2019-08-10 PROCEDURE — G0463 HOSPITAL OUTPT CLINIC VISIT: HCPCS | Performed by: OTOLARYNGOLOGY

## 2019-08-10 RX ORDER — AZELASTINE 1 MG/ML
2 SPRAY, METERED NASAL 2 TIMES DAILY
Qty: 1 BOTTLE | Refills: 0 | Status: SHIPPED | OUTPATIENT
Start: 2019-08-10 | End: 2019-11-15

## 2019-08-10 RX ORDER — MONTELUKAST SODIUM 10 MG/1
10 TABLET ORAL NIGHTLY
Qty: 30 TABLET | Refills: 3 | Status: SHIPPED | OUTPATIENT
Start: 2019-08-10 | End: 2019-11-15

## 2019-08-10 RX ORDER — LORATADINE 10 MG/1
10 TABLET ORAL DAILY
Qty: 30 TABLET | Refills: 3 | Status: SHIPPED | OUTPATIENT
Start: 2019-08-10 | End: 2019-11-15

## 2019-08-12 ENCOUNTER — TELEPHONE (OUTPATIENT)
Dept: INTERNAL MEDICINE CLINIC | Facility: CLINIC | Age: 69
End: 2019-08-12

## 2019-08-12 NOTE — TELEPHONE ENCOUNTER
To nursing, please tell patient throat culture done 8/9/19–results are negative. I see that she saw Dr. Ray [place] 8/10/19 and he prescribed montelukast.    Thanks.

## 2019-09-25 ENCOUNTER — MA CHART PREP (OUTPATIENT)
Dept: FAMILY MEDICINE CLINIC | Facility: CLINIC | Age: 69
End: 2019-09-25

## 2019-09-25 PROBLEM — M85.80 OSTEOPENIA: Status: ACTIVE | Noted: 2019-09-25

## 2019-10-17 ENCOUNTER — NURSE ONLY (OUTPATIENT)
Dept: INTERNAL MEDICINE CLINIC | Facility: CLINIC | Age: 69
End: 2019-10-17
Payer: COMMERCIAL

## 2019-10-17 DIAGNOSIS — Z23 NEED FOR INFLUENZA VACCINATION: Primary | ICD-10-CM

## 2019-10-17 PROCEDURE — 90662 IIV NO PRSV INCREASED AG IM: CPT | Performed by: INTERNAL MEDICINE

## 2019-10-17 PROCEDURE — G0008 ADMIN INFLUENZA VIRUS VAC: HCPCS | Performed by: INTERNAL MEDICINE

## 2019-10-17 NOTE — PROGRESS NOTES
Patient present for Influenza vaccine. Patient's name and date of birth verified. Influenza consent form completed and signed by patient. Injection well tolerated to right deltoid with no adverse reactions noted.

## 2019-11-12 ENCOUNTER — HOSPITAL ENCOUNTER (OUTPATIENT)
Dept: MAMMOGRAPHY | Facility: HOSPITAL | Age: 69
Discharge: HOME | End: 2019-11-12
Attending: INTERNAL MEDICINE
Payer: MEDICARE

## 2019-11-12 DIAGNOSIS — Z12.31 VISIT FOR SCREENING MAMMOGRAM: ICD-10-CM

## 2019-11-12 PROCEDURE — 77067 SCR MAMMO BI INCL CAD: CPT | Performed by: INTERNAL MEDICINE

## 2019-11-12 PROCEDURE — 77063 BREAST TOMOSYNTHESIS BI: CPT | Performed by: INTERNAL MEDICINE

## 2019-11-15 ENCOUNTER — OFFICE VISIT (OUTPATIENT)
Dept: INTERNAL MEDICINE CLINIC | Facility: CLINIC | Age: 69
End: 2019-11-15
Payer: COMMERCIAL

## 2019-11-15 VITALS
BODY MASS INDEX: 25.07 KG/M2 | HEART RATE: 68 BPM | DIASTOLIC BLOOD PRESSURE: 70 MMHG | WEIGHT: 156 LBS | HEIGHT: 66 IN | TEMPERATURE: 98 F | SYSTOLIC BLOOD PRESSURE: 120 MMHG

## 2019-11-15 DIAGNOSIS — M17.11 PRIMARY OSTEOARTHRITIS OF RIGHT KNEE: ICD-10-CM

## 2019-11-15 DIAGNOSIS — Z87.09 HISTORY OF HEMOPTYSIS: ICD-10-CM

## 2019-11-15 DIAGNOSIS — Z85.3 HISTORY OF BREAST CANCER: ICD-10-CM

## 2019-11-15 DIAGNOSIS — I10 ESSENTIAL HYPERTENSION: ICD-10-CM

## 2019-11-15 DIAGNOSIS — Z00.00 MEDICARE ANNUAL WELLNESS VISIT, INITIAL: Primary | ICD-10-CM

## 2019-11-15 DIAGNOSIS — M85.80 OSTEOPENIA, UNSPECIFIED LOCATION: ICD-10-CM

## 2019-11-15 PROCEDURE — 90732 PPSV23 VACC 2 YRS+ SUBQ/IM: CPT | Performed by: INTERNAL MEDICINE

## 2019-11-15 PROCEDURE — G0439 PPPS, SUBSEQ VISIT: HCPCS | Performed by: INTERNAL MEDICINE

## 2019-11-15 PROCEDURE — 96160 PT-FOCUSED HLTH RISK ASSMT: CPT | Performed by: INTERNAL MEDICINE

## 2019-11-15 PROCEDURE — G0009 ADMIN PNEUMOCOCCAL VACCINE: HCPCS | Performed by: INTERNAL MEDICINE

## 2019-11-15 PROCEDURE — 99397 PER PM REEVAL EST PAT 65+ YR: CPT | Performed by: INTERNAL MEDICINE

## 2019-11-15 NOTE — PROGRESS NOTES
Serena Baltazar is a 81year old female who presents for     6 mo check and medicare annual     Feels good.   Just returned from trip to [place]. HTN: home BPs 120/70 range. Tolerating Maxzide 37.5/25 mg daily. R knee doing ok.  Did a half marathon th Yes      Comment: wine, rarely     Drug use: No         Allergies:  No Known Allergies    Review of systems:  Constitutional:  No fever, loss of appetite or unintentional weight loss  Respiratory: No cough, wheezing or dyspnea  Cardiac: No chest pain or pa Vaccines:-Zostavax 8/14. shingrix disc again 11/15/19 visit. Tdap 10/13. Flu shot 10/17/19. Prevnar 4/3/18,  Pmvx 11/15/19.   colonoscopy 3/5/19 Dr. Manisha Ma and hemorrhoids.     Lab 10/19/18-cbc cmp tsh lipid ua wreflex--ua 63 wbc's 6 rbc's No    Vision Problems? : No    Difficulty walking?: No    Difficulty dressing or bathing?: No    Problems with daily activities? : No    Memory Problems?: No      Fall/Risk Assessment          Have you fallen in the last 12 months?: 0-No Date Value   08/08/2017 Negative   08/08/2017 Negative   08/08/2017 Negative    No flowsheet data found.     Glaucoma Screening      Ophthalmology Visit Annually     Bone Density Screening      Dexascan Every two years Last Dexa Scan:   XR DEXA BONE DENSI Annually No results found for: A1C No flowsheet data found. Creat/alb ratio  Annually      LDL  Annually LDL Cholesterol (mg/dL)   Date Value   10/19/2018 112 (H)   07/09/2015 94    No flowsheet data found.      Dilated Eye exam  Annually No flowsheet da

## 2019-11-29 ENCOUNTER — TELEPHONE (OUTPATIENT)
Dept: INTERNAL MEDICINE CLINIC | Facility: CLINIC | Age: 69
End: 2019-11-29

## 2019-11-29 ENCOUNTER — LAB ENCOUNTER (OUTPATIENT)
Dept: LAB | Facility: HOSPITAL | Age: 69
End: 2019-11-29
Attending: INTERNAL MEDICINE
Payer: MEDICARE

## 2019-11-29 DIAGNOSIS — I10 ESSENTIAL HYPERTENSION: ICD-10-CM

## 2019-11-29 PROCEDURE — 80061 LIPID PANEL: CPT

## 2019-11-29 PROCEDURE — 85025 COMPLETE CBC W/AUTO DIFF WBC: CPT

## 2019-11-29 PROCEDURE — 36415 COLL VENOUS BLD VENIPUNCTURE: CPT

## 2019-11-29 PROCEDURE — 84443 ASSAY THYROID STIM HORMONE: CPT

## 2019-11-29 PROCEDURE — 80053 COMPREHEN METABOLIC PANEL: CPT

## 2019-11-29 NOTE — TELEPHONE ENCOUNTER
To nursing, please tell pt blood and urine tests done today--results are ok. Thanks. Note to self--  11/29/19-cbc cmp tsh lipid ua --results ok.

## 2020-01-02 RX ORDER — TRIAMTERENE AND HYDROCHLOROTHIAZIDE 37.5; 25 MG/1; MG/1
TABLET ORAL
Qty: 90 TABLET | Refills: 3 | Status: SHIPPED | OUTPATIENT
Start: 2020-01-02 | End: 2021-01-25

## 2020-06-05 ENCOUNTER — OFFICE VISIT (OUTPATIENT)
Dept: INTERNAL MEDICINE CLINIC | Facility: CLINIC | Age: 70
End: 2020-06-05
Payer: COMMERCIAL

## 2020-06-05 VITALS
SYSTOLIC BLOOD PRESSURE: 138 MMHG | DIASTOLIC BLOOD PRESSURE: 68 MMHG | HEIGHT: 66 IN | TEMPERATURE: 99 F | BODY MASS INDEX: 24.27 KG/M2 | WEIGHT: 151 LBS | HEART RATE: 68 BPM

## 2020-06-05 DIAGNOSIS — R04.2 HEMOPTYSIS: ICD-10-CM

## 2020-06-05 DIAGNOSIS — Z12.31 VISIT FOR SCREENING MAMMOGRAM: ICD-10-CM

## 2020-06-05 DIAGNOSIS — M17.11 PRIMARY OSTEOARTHRITIS OF RIGHT KNEE: ICD-10-CM

## 2020-06-05 DIAGNOSIS — I10 ESSENTIAL HYPERTENSION: Primary | ICD-10-CM

## 2020-06-05 DIAGNOSIS — Z85.3 HISTORY OF BREAST CANCER: ICD-10-CM

## 2020-06-05 PROCEDURE — G0463 HOSPITAL OUTPT CLINIC VISIT: HCPCS | Performed by: INTERNAL MEDICINE

## 2020-06-05 PROCEDURE — 99214 OFFICE O/P EST MOD 30 MIN: CPT | Performed by: INTERNAL MEDICINE

## 2020-06-05 NOTE — PROGRESS NOTES
Philippe Mccullough is a 81year old female who presents for     6 mo check     Feels good.   Just returned from winter in [place]. Tasha Colby HTN: home BPs not checked. Tolerating Maxzide 37.5/25 mg daily. Lost 5 # by diet. Walks 5 mi/day.      Sometimes still pos Cancer-throat-- age 64 (cause of death)   • Depression Father    • Heart Disorder Mother          age 61 (cause of death)   • Cancer Brother 64        multiple myeloma-- age 68   • Other (Other) Brother         Sleep apnea   • Breast Cancer Edna post nasal drip. She will proceed.     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 Saw Dr Dony Flores.     History L breast cancer-mammo isabel 11/12/19--ok.   Disc going to risk cancer clinic at 6/5/20 visit.

## 2020-06-26 ENCOUNTER — TELEPHONE (OUTPATIENT)
Dept: CASE MANAGEMENT | Age: 70
End: 2020-06-26

## 2020-06-26 NOTE — TELEPHONE ENCOUNTER
Medicare Advantage Supervisit scheduled for 11/17/20, need to check with patient if OK to reschedule before 9/15/2020. Left message to call back [phone].

## 2020-10-06 ENCOUNTER — TELEPHONE (OUTPATIENT)
Dept: INTERNAL MEDICINE CLINIC | Facility: CLINIC | Age: 70
End: 2020-10-06

## 2020-10-06 DIAGNOSIS — Z85.3 HISTORY OF BREAST CANCER: Primary | ICD-10-CM

## 2020-10-06 NOTE — TELEPHONE ENCOUNTER
To nursing, she needs to see Jasvir Barreto, the  at [facility] who can decide what braca tests she needs and can give her interpretation of the results. Ask her to call [phone] for an appointment. Thanks.     1. History of dee

## 2020-10-12 ENCOUNTER — TELEPHONE (OUTPATIENT)
Dept: INTERNAL MEDICINE CLINIC | Facility: CLINIC | Age: 70
End: 2020-10-12

## 2020-10-12 ENCOUNTER — TELEPHONE (OUTPATIENT)
Dept: GENETICS | Facility: HOSPITAL | Age: 70
End: 2020-10-12

## 2020-10-12 NOTE — TELEPHONE ENCOUNTER
I spoke with patient and relayed Dr. Titi Jarvis message. She verbalized understanding. She says she does not have a shoulder x-ray. She is able to come for an appointment tomorrow October 13 at 2:00 p.m. To  to schedule appointment.

## 2020-10-12 NOTE — TELEPHONE ENCOUNTER
Nursing, please tell patient I need to see her for her shoulder before I can order physical therapy. Can see her this week for her shoulder-asked her to bring reports of any x-rays that may have been done by the chiropractor. Thanks.

## 2020-10-12 NOTE — TELEPHONE ENCOUNTER
Pt requesting orders for PT for her frozen shoulder  This was recommended by pt chiropractor  Pt was considering Shelly Shook  Tasked to nursing

## 2020-10-12 NOTE — TELEPHONE ENCOUNTER
I spoke with patient. She is asking for a PT order for her right shoulder. She says she has a frozen shoulder. She would like to go to [facility] in [address]. She has not seen Dr. Destiny Barry for her shoulder. She has an appointment on 11/17/20. She would like to do PT now because she will go to [place] for the winter. To Dr. Destiny Barry.

## 2020-10-19 ENCOUNTER — OFFICE VISIT (OUTPATIENT)
Dept: INTERNAL MEDICINE CLINIC | Facility: CLINIC | Age: 70
End: 2020-10-19
Payer: COMMERCIAL

## 2020-10-19 ENCOUNTER — APPOINTMENT (OUTPATIENT)
Dept: LAB | Age: 70
End: 2020-10-19
Attending: INTERNAL MEDICINE
Payer: MEDICARE

## 2020-10-19 VITALS
HEIGHT: 66 IN | WEIGHT: 153.38 LBS | HEART RATE: 66 BPM | TEMPERATURE: 97 F | SYSTOLIC BLOOD PRESSURE: 132 MMHG | BODY MASS INDEX: 24.65 KG/M2 | OXYGEN SATURATION: 98 % | DIASTOLIC BLOOD PRESSURE: 68 MMHG

## 2020-10-19 DIAGNOSIS — N39.0 ACUTE UTI: Primary | ICD-10-CM

## 2020-10-19 PROCEDURE — 87088 URINE BACTERIA CULTURE: CPT | Performed by: INTERNAL MEDICINE

## 2020-10-19 PROCEDURE — 81001 URINALYSIS AUTO W/SCOPE: CPT | Performed by: INTERNAL MEDICINE

## 2020-10-19 PROCEDURE — 3075F SYST BP GE 130 - 139MM HG: CPT | Performed by: INTERNAL MEDICINE

## 2020-10-19 PROCEDURE — 3078F DIAST BP <80 MM HG: CPT | Performed by: INTERNAL MEDICINE

## 2020-10-19 PROCEDURE — G0463 HOSPITAL OUTPT CLINIC VISIT: HCPCS | Performed by: INTERNAL MEDICINE

## 2020-10-19 PROCEDURE — 87086 URINE CULTURE/COLONY COUNT: CPT | Performed by: INTERNAL MEDICINE

## 2020-10-19 PROCEDURE — 87186 SC STD MICRODIL/AGAR DIL: CPT | Performed by: INTERNAL MEDICINE

## 2020-10-19 PROCEDURE — 3008F BODY MASS INDEX DOCD: CPT | Performed by: INTERNAL MEDICINE

## 2020-10-19 PROCEDURE — 99213 OFFICE O/P EST LOW 20 MIN: CPT | Performed by: INTERNAL MEDICINE

## 2020-10-19 RX ORDER — NITROFURANTOIN 25; 75 MG/1; MG/1
100 CAPSULE ORAL 2 TIMES DAILY
Qty: 10 CAPSULE | Refills: 0 | Status: SHIPPED | OUTPATIENT
Start: 2020-10-19 | End: 2020-11-04

## 2020-10-19 NOTE — PROGRESS NOTES
Ayah Burks is a 81year old femalewho presents with     urinary symptoms. Onset: 2 days ago \"felt weird\" as voiding  Started with: discomfort w voiding and \"a little blood\" in urine. Has dysuria, frequency, urgency.   Once voids, feels she had Used    Alcohol use: Yes      Comment: wine, rarely     Drug use: No         Allergies:  No Known Allergies    EXAM:   /68 (BP Location: Right arm, Patient Position: Sitting)   Pulse 66   Temp 97.4 °F (36.3 °C) (Oral)   Ht 5' 6\" (1.676 m)   Wt 153 l

## 2020-10-20 ENCOUNTER — TELEPHONE (OUTPATIENT)
Dept: INTERNAL MEDICINE CLINIC | Facility: CLINIC | Age: 70
End: 2020-10-20

## 2020-10-20 DIAGNOSIS — B96.20 E. COLI UTI: Primary | ICD-10-CM

## 2020-10-20 DIAGNOSIS — N39.0 E. COLI UTI: Primary | ICD-10-CM

## 2020-10-20 NOTE — TELEPHONE ENCOUNTER
Nursing, please tell patient the urine culture is still pending. Hopefully will have results tomorrow. Thanks. Note to self–  10/19/20–UA–524  RBC. Ucx pending. Prelim is > 100 K E. coli–awaiting sensitivities.

## 2020-10-21 ENCOUNTER — IMMUNIZATION (OUTPATIENT)
Dept: INTERNAL MEDICINE CLINIC | Facility: CLINIC | Age: 70
End: 2020-10-21
Payer: COMMERCIAL

## 2020-10-21 DIAGNOSIS — Z23 NEED FOR VACCINATION: ICD-10-CM

## 2020-10-21 PROCEDURE — 90662 IIV NO PRSV INCREASED AG IM: CPT | Performed by: INTERNAL MEDICINE

## 2020-10-21 PROCEDURE — G0008 ADMIN INFLUENZA VIRUS VAC: HCPCS | Performed by: INTERNAL MEDICINE

## 2020-10-21 NOTE — TELEPHONE ENCOUNTER
To nursing, please tell patient urine culture result was not final until late this morning. It shows a UTI that Macrobid should clear. Repeat urinalysis and urine culture after finished with the treatment to verify it is clear. Thanks.         Note to se

## 2020-10-23 ENCOUNTER — NURSE ONLY (OUTPATIENT)
Dept: HEMATOLOGY/ONCOLOGY | Facility: HOSPITAL | Age: 70
End: 2020-10-23
Attending: GENETIC COUNSELOR, MS
Payer: MEDICARE

## 2020-10-23 ENCOUNTER — GENETICS ENCOUNTER (OUTPATIENT)
Dept: GENETICS | Facility: HOSPITAL | Age: 70
End: 2020-10-23
Attending: GENETIC COUNSELOR, MS
Payer: MEDICARE

## 2020-10-23 PROCEDURE — 36415 COLL VENOUS BLD VENIPUNCTURE: CPT

## 2020-10-23 PROCEDURE — 96040 HC GENETIC COUNSELING EA 30 MIN: CPT

## 2020-10-23 NOTE — PROGRESS NOTES
Patient Name: Maria Antonia Carrillo  YOB: 1950  Date of Visit: 10/23/2020    Reason for visit: Ms. Anthony Rausch was seen for the purposes of genetic counseling due to her previous diagnosis of breast cancer at age 42 and a family history of prostate ca  at 68 due to potential cardiac issues, neither were reported to have a history of cancers. Ms. Tirado Grandchild father  at 64 due to complications from throat cancer diagnosed in his late 46s, with a history of smoking.  She has no paternal aunts and particular cell will undergo malignant transformation. In most individuals, mutations need to occur in both tumor suppressor genes of a particular cell during the course of their lifetime in order for malignant transformation to occur.   In individuals wit positive result indicates a mutation has been identified, and there is an increased risk for the cancers associated with the specific gene.   Since mutations in most cancer susceptibility genes are inherited in an autosomal dominant fashion, siblings and ch informative of the familial cancer risk as it is unknown whether no mutation was found because the individual tested is truly negative for the familial mutation or whether the familial mutation was unable to be detected by the genetic testing method used.

## 2020-10-28 ENCOUNTER — TELEPHONE (OUTPATIENT)
Dept: INTERNAL MEDICINE CLINIC | Facility: CLINIC | Age: 70
End: 2020-10-28

## 2020-10-28 ENCOUNTER — APPOINTMENT (OUTPATIENT)
Dept: LAB | Facility: HOSPITAL | Age: 70
End: 2020-10-28
Attending: INTERNAL MEDICINE
Payer: MEDICARE

## 2020-10-28 PROCEDURE — 87086 URINE CULTURE/COLONY COUNT: CPT | Performed by: INTERNAL MEDICINE

## 2020-10-28 PROCEDURE — 84443 ASSAY THYROID STIM HORMONE: CPT | Performed by: INTERNAL MEDICINE

## 2020-10-28 PROCEDURE — 81003 URINALYSIS AUTO W/O SCOPE: CPT | Performed by: INTERNAL MEDICINE

## 2020-10-28 PROCEDURE — 80061 LIPID PANEL: CPT | Performed by: INTERNAL MEDICINE

## 2020-10-28 PROCEDURE — 80053 COMPREHEN METABOLIC PANEL: CPT | Performed by: INTERNAL MEDICINE

## 2020-10-28 PROCEDURE — 85025 COMPLETE CBC W/AUTO DIFF WBC: CPT | Performed by: INTERNAL MEDICINE

## 2020-10-28 PROCEDURE — 36415 COLL VENOUS BLD VENIPUNCTURE: CPT | Performed by: INTERNAL MEDICINE

## 2020-10-28 NOTE — TELEPHONE ENCOUNTER
To nursing, please tell patient  Lab results are okay. the urinalysis is now clear–urine culture is still pending. See me 11/17/20 as planned. Thanks. Note to self–  10/28/20–cbc cmp tsh lipid ua, ucx--ok. ucx pending.

## 2020-10-29 NOTE — TELEPHONE ENCOUNTER
Clinical to F/U 10/30. Attempted to reach Cortney Whitehead. Rings several times, then call ends. No Answer.

## 2020-10-30 ENCOUNTER — HOSPITAL ENCOUNTER (EMERGENCY)
Facility: HOSPITAL | Age: 70
Discharge: HOME | End: 2020-10-30
Attending: EMERGENCY MEDICINE
Payer: MEDICARE

## 2020-10-30 ENCOUNTER — APPOINTMENT (OUTPATIENT)
Dept: GENERAL RADIOLOGY | Facility: HOSPITAL | Age: 70
End: 2020-10-30
Attending: EMERGENCY MEDICINE
Payer: MEDICARE

## 2020-10-30 VITALS
TEMPERATURE: 98 F | HEART RATE: 58 BPM | DIASTOLIC BLOOD PRESSURE: 88 MMHG | SYSTOLIC BLOOD PRESSURE: 128 MMHG | OXYGEN SATURATION: 98 % | RESPIRATION RATE: 18 BRPM

## 2020-10-30 DIAGNOSIS — S52.022A CLOSED FRACTURE OF OLECRANON PROCESS OF LEFT ULNA, INITIAL ENCOUNTER: Primary | ICD-10-CM

## 2020-10-30 PROCEDURE — 29105 APPLICATION LONG ARM SPLINT: CPT

## 2020-10-30 PROCEDURE — 73080 X-RAY EXAM OF ELBOW: CPT | Performed by: EMERGENCY MEDICINE

## 2020-10-30 PROCEDURE — 99284 EMERGENCY DEPT VISIT MOD MDM: CPT

## 2020-10-30 RX ORDER — TRAMADOL HYDROCHLORIDE 50 MG/1
50 TABLET ORAL EVERY 6 HOURS PRN
Qty: 10 TABLET | Refills: 0 | Status: ON HOLD | OUTPATIENT
Start: 2020-10-30 | End: 2020-11-05

## 2020-10-31 NOTE — ED PROVIDER NOTES
Patient Seen in: [facility] AND [facility] Emergency Department      History   Patient presents with:  Arm or Hand Injury    Stated Complaint: arm/elbow injury    HPI    62-year-old female with history of hypertension, diverticulosis, here after mechanical fall Negative for fever. HENT: Negative for congestion. Eyes: Negative for visual disturbance. Respiratory: Negative for shortness of breath. Cardiovascular: Negative for chest pain. Gastrointestinal: Negative for abdominal pain.    Genitourinary: Ne applied to the patient by a tech and adjusted by me. The patient was neurovascularly intact as checked by me after application. DIAGNOSTICS:   Labs:  No results found for this or any previous visit (from the past 24 hour(s)).     Imaging Results Avail instructions    EMERGENCY DEPARTMENT MEDICAL DECISION MAKING:  After obtaining the patient's history, performing the physical exam and reviewing the diagnostics, multiple initial diagnoses were considered based on the presenting problem including fracture,

## 2020-10-31 NOTE — ED INITIAL ASSESSMENT (HPI)
S: Fall  B: Patient fell today while walking. Tripped over a cement entrance to a driveway directly onto the elbow. A: Some slow rom. Swelling present over the joint.

## 2020-11-04 ENCOUNTER — APPOINTMENT (OUTPATIENT)
Dept: LAB | Age: 70
End: 2020-11-04
Attending: ORTHOPAEDIC SURGERY
Payer: MEDICARE

## 2020-11-04 DIAGNOSIS — Z01.818 PREOP TESTING: ICD-10-CM

## 2020-11-04 RX ORDER — ACETAMINOPHEN 325 MG/1
650 TABLET ORAL EVERY 6 HOURS PRN
COMMUNITY
End: 2020-11-17

## 2020-11-05 ENCOUNTER — HOSPITAL ENCOUNTER (OUTPATIENT)
Facility: HOSPITAL | Age: 70
Setting detail: HOSPITAL OUTPATIENT SURGERY
Discharge: HOME | End: 2020-11-05
Attending: ORTHOPAEDIC SURGERY | Admitting: ORTHOPAEDIC SURGERY
Payer: MEDICARE

## 2020-11-05 ENCOUNTER — ANESTHESIA (OUTPATIENT)
Dept: SURGERY | Facility: HOSPITAL | Age: 70
End: 2020-11-05
Payer: MEDICARE

## 2020-11-05 ENCOUNTER — APPOINTMENT (OUTPATIENT)
Dept: GENERAL RADIOLOGY | Facility: HOSPITAL | Age: 70
End: 2020-11-05
Attending: ORTHOPAEDIC SURGERY
Payer: MEDICARE

## 2020-11-05 ENCOUNTER — ANESTHESIA EVENT (OUTPATIENT)
Dept: SURGERY | Facility: HOSPITAL | Age: 70
End: 2020-11-05
Payer: MEDICARE

## 2020-11-05 VITALS
HEART RATE: 55 BPM | BODY MASS INDEX: 25.19 KG/M2 | HEIGHT: 65.5 IN | SYSTOLIC BLOOD PRESSURE: 131 MMHG | RESPIRATION RATE: 14 BRPM | WEIGHT: 153 LBS | OXYGEN SATURATION: 100 % | DIASTOLIC BLOOD PRESSURE: 67 MMHG | TEMPERATURE: 98 F

## 2020-11-05 DIAGNOSIS — Z01.818 PREOP TESTING: Primary | ICD-10-CM

## 2020-11-05 PROBLEM — S52.022A CLOSED FRACTURE OF LEFT OLECRANON PROCESS: Status: ACTIVE | Noted: 2020-11-05

## 2020-11-05 PROCEDURE — 0PSL04Z REPOSITION LEFT ULNA WITH INTERNAL FIXATION DEVICE, OPEN APPROACH: ICD-10-PCS | Performed by: ORTHOPAEDIC SURGERY

## 2020-11-05 PROCEDURE — C1713 ANCHOR/SCREW BN/BN,TIS/BN: HCPCS | Performed by: ORTHOPAEDIC SURGERY

## 2020-11-05 PROCEDURE — 76000 FLUOROSCOPY <1 HR PHYS/QHP: CPT | Performed by: ORTHOPAEDIC SURGERY

## 2020-11-05 RX ORDER — MORPHINE SULFATE 10 MG/ML
6 INJECTION, SOLUTION INTRAMUSCULAR; INTRAVENOUS EVERY 10 MIN PRN
Status: DISCONTINUED | OUTPATIENT
Start: 2020-11-05 | End: 2020-11-05

## 2020-11-05 RX ORDER — BUPIVACAINE HYDROCHLORIDE 5 MG/ML
INJECTION, SOLUTION EPIDURAL; INTRACAUDAL AS NEEDED
Status: DISCONTINUED | OUTPATIENT
Start: 2020-11-05 | End: 2020-11-05 | Stop reason: HOSPADM

## 2020-11-05 RX ORDER — CEFAZOLIN SODIUM/WATER 2 G/20 ML
2 SYRINGE (ML) INTRAVENOUS ONCE
Status: COMPLETED | OUTPATIENT
Start: 2020-11-05 | End: 2020-11-05

## 2020-11-05 RX ORDER — ONDANSETRON 2 MG/ML
4 INJECTION INTRAMUSCULAR; INTRAVENOUS ONCE AS NEEDED
Status: DISCONTINUED | OUTPATIENT
Start: 2020-11-05 | End: 2020-11-05

## 2020-11-05 RX ORDER — ACETAMINOPHEN 500 MG
1000 TABLET ORAL ONCE
Status: COMPLETED | OUTPATIENT
Start: 2020-11-05 | End: 2020-11-05

## 2020-11-05 RX ORDER — HYDROCODONE BITARTRATE AND ACETAMINOPHEN 5; 325 MG/1; MG/1
2 TABLET ORAL AS NEEDED
Status: DISCONTINUED | OUTPATIENT
Start: 2020-11-05 | End: 2020-11-05

## 2020-11-05 RX ORDER — MORPHINE SULFATE 4 MG/ML
2 INJECTION, SOLUTION INTRAMUSCULAR; INTRAVENOUS EVERY 10 MIN PRN
Status: DISCONTINUED | OUTPATIENT
Start: 2020-11-05 | End: 2020-11-05

## 2020-11-05 RX ORDER — PROCHLORPERAZINE EDISYLATE 5 MG/ML
5 INJECTION INTRAMUSCULAR; INTRAVENOUS ONCE AS NEEDED
Status: DISCONTINUED | OUTPATIENT
Start: 2020-11-05 | End: 2020-11-05

## 2020-11-05 RX ORDER — GLYCOPYRROLATE 0.2 MG/ML
INJECTION, SOLUTION INTRAMUSCULAR; INTRAVENOUS AS NEEDED
Status: DISCONTINUED | OUTPATIENT
Start: 2020-11-05 | End: 2020-11-05 | Stop reason: SURG

## 2020-11-05 RX ORDER — HYDROMORPHONE HYDROCHLORIDE 1 MG/ML
0.4 INJECTION, SOLUTION INTRAMUSCULAR; INTRAVENOUS; SUBCUTANEOUS EVERY 5 MIN PRN
Status: DISCONTINUED | OUTPATIENT
Start: 2020-11-05 | End: 2020-11-05

## 2020-11-05 RX ORDER — ONDANSETRON 2 MG/ML
INJECTION INTRAMUSCULAR; INTRAVENOUS AS NEEDED
Status: DISCONTINUED | OUTPATIENT
Start: 2020-11-05 | End: 2020-11-05 | Stop reason: SURG

## 2020-11-05 RX ORDER — HYDROMORPHONE HYDROCHLORIDE 1 MG/ML
0.2 INJECTION, SOLUTION INTRAMUSCULAR; INTRAVENOUS; SUBCUTANEOUS EVERY 5 MIN PRN
Status: DISCONTINUED | OUTPATIENT
Start: 2020-11-05 | End: 2020-11-05

## 2020-11-05 RX ORDER — NALOXONE HYDROCHLORIDE 0.4 MG/ML
80 INJECTION, SOLUTION INTRAMUSCULAR; INTRAVENOUS; SUBCUTANEOUS AS NEEDED
Status: DISCONTINUED | OUTPATIENT
Start: 2020-11-05 | End: 2020-11-05

## 2020-11-05 RX ORDER — HYDROCODONE BITARTRATE AND ACETAMINOPHEN 5; 325 MG/1; MG/1
1-2 TABLET ORAL EVERY 6 HOURS PRN
Qty: 40 TABLET | Refills: 0 | Status: SHIPPED | OUTPATIENT
Start: 2020-11-05 | End: 2020-11-17

## 2020-11-05 RX ORDER — SODIUM CHLORIDE, SODIUM LACTATE, POTASSIUM CHLORIDE, CALCIUM CHLORIDE 600; 310; 30; 20 MG/100ML; MG/100ML; MG/100ML; MG/100ML
INJECTION, SOLUTION INTRAVENOUS CONTINUOUS
Status: DISCONTINUED | OUTPATIENT
Start: 2020-11-05 | End: 2020-11-05

## 2020-11-05 RX ORDER — LIDOCAINE HYDROCHLORIDE 10 MG/ML
INJECTION, SOLUTION EPIDURAL; INFILTRATION; INTRACAUDAL; PERINEURAL AS NEEDED
Status: DISCONTINUED | OUTPATIENT
Start: 2020-11-05 | End: 2020-11-05 | Stop reason: SURG

## 2020-11-05 RX ORDER — DEXAMETHASONE SODIUM PHOSPHATE 4 MG/ML
VIAL (ML) INJECTION AS NEEDED
Status: DISCONTINUED | OUTPATIENT
Start: 2020-11-05 | End: 2020-11-05 | Stop reason: SURG

## 2020-11-05 RX ORDER — MIDAZOLAM HYDROCHLORIDE 1 MG/ML
INJECTION INTRAMUSCULAR; INTRAVENOUS AS NEEDED
Status: DISCONTINUED | OUTPATIENT
Start: 2020-11-05 | End: 2020-11-05 | Stop reason: SURG

## 2020-11-05 RX ORDER — HYDROCODONE BITARTRATE AND ACETAMINOPHEN 5; 325 MG/1; MG/1
1 TABLET ORAL AS NEEDED
Status: DISCONTINUED | OUTPATIENT
Start: 2020-11-05 | End: 2020-11-05

## 2020-11-05 RX ORDER — MORPHINE SULFATE 4 MG/ML
4 INJECTION, SOLUTION INTRAMUSCULAR; INTRAVENOUS EVERY 10 MIN PRN
Status: DISCONTINUED | OUTPATIENT
Start: 2020-11-05 | End: 2020-11-05

## 2020-11-05 RX ORDER — HYDROMORPHONE HYDROCHLORIDE 1 MG/ML
0.6 INJECTION, SOLUTION INTRAMUSCULAR; INTRAVENOUS; SUBCUTANEOUS EVERY 5 MIN PRN
Status: DISCONTINUED | OUTPATIENT
Start: 2020-11-05 | End: 2020-11-05

## 2020-11-05 RX ADMIN — GLYCOPYRROLATE 0.2 MG: 0.2 INJECTION, SOLUTION INTRAMUSCULAR; INTRAVENOUS at 11:04:00

## 2020-11-05 RX ADMIN — MIDAZOLAM HYDROCHLORIDE 2 MG: 1 INJECTION INTRAMUSCULAR; INTRAVENOUS at 10:34:00

## 2020-11-05 RX ADMIN — SODIUM CHLORIDE, SODIUM LACTATE, POTASSIUM CHLORIDE, CALCIUM CHLORIDE: 600; 310; 30; 20 INJECTION, SOLUTION INTRAVENOUS at 11:51:00

## 2020-11-05 RX ADMIN — CEFAZOLIN SODIUM/WATER 2 G: 2 G/20 ML SYRINGE (ML) INTRAVENOUS at 10:45:00

## 2020-11-05 RX ADMIN — ONDANSETRON 4 MG: 2 INJECTION INTRAMUSCULAR; INTRAVENOUS at 11:27:00

## 2020-11-05 RX ADMIN — DEXAMETHASONE SODIUM PHOSPHATE 4 MG: 4 MG/ML VIAL (ML) INJECTION at 10:50:00

## 2020-11-05 RX ADMIN — LIDOCAINE HYDROCHLORIDE 50 MG: 10 INJECTION, SOLUTION EPIDURAL; INFILTRATION; INTRACAUDAL; PERINEURAL at 10:40:00

## 2020-11-05 NOTE — BRIEF OP NOTE
Pre-Operative Diagnosis: fracture left olecranon     Post-Operative Diagnosis: fracture left olecranon      Procedure Performed:   Procedure(s):  left olecranon open reduction internal fixation    Surgeon(s) and Role:     Angie Luu MD - Primary

## 2020-11-05 NOTE — H&P
[facility] Patient Status:  Hospital Outpatient Surgery    10/30/1950 MRN [id number]   Location [facility] PRE OP RECOVERY Attending Tremaine Pearce MD   Hosp Day # 0 PCP Glenroy Estes Cancer-throat-- age 64 (cause of death)   • Depression Father    • Heart Disorder Mother          age 61 (cause of death)   • Cancer Brother 64        multiple myeloma-- age 68   • Other (Other) Brother         Sleep apnea   • Breast Cancer Edna ecchymosis  Skin: Normal texture and turgor. Lymphatic:  No palpable cervical lymphadenopathy. Neurologic: Cranial nerves are grossly intact. Motor strength and sensory examination is grossly normal.  No focal neurologic deficit.     Laboratory Data:

## 2020-11-05 NOTE — ANESTHESIA PREPROCEDURE EVALUATION
Anesthesia PreOp Note    HPI:     Vincent Griggs is a 78year old female who presents for preoperative consultation requested by: Javid Bautista MD    Date of Surgery: 11/5/2020    Procedure(s):  ELBOW OPEN REDUCTION INTERNAL FIXATION  Indication: fracture • HYSTEROSCOPY  1998    Hysteroscopy/D&C   • LUMPECTOMY LEFT  1997   • RADIATION LEFT  1997         •  acetaminophen 325 MG Oral Tab, Take 650 mg by mouth every 6 (six) hours as needed for Pain., Disp: , Rfl: , 11/4/2020 at Unknown time    •  TRIAMTERENE-H Sexual activity: Not on file    Lifestyle      Physical activity        Days per week: Not on file        Minutes per session: Not on file      Stress: Not on file    Relationships      Social connections        Talks on phone: Not on file        Gets height is 1.664 m (5' 5.5\") and weight is 69.4 kg (153 lb). Her oral temperature is 98 °F (36.7 °C). Her blood pressure is 129/80 and her pulse is 64. Her respiration is 17 and oxygen saturation is 100%.     11/04/20  1105 11/05/20  0909   BP:  129/80   P

## 2020-11-05 NOTE — ANESTHESIA POSTPROCEDURE EVALUATION
Patient: Bartolo Cm    Procedure Summary     Date: 11/05/20 Room / Location: [facility] OR  / [facility] OR    Anesthesia Start: 1034 Anesthesia Stop: 9161    Procedure: ELBOW OPEN REDUCTION INTERNAL FIXATION (Left Elbow) Diagnosis: (fracture left olecran

## 2020-11-06 NOTE — OPERATIVE REPORT
[facility]    PATIENT'S NAME: Kady Plaster   ATTENDING PHYSICIAN: Shawanda Healy MD   OPERATING PHYSICIAN: Shawanda Healy MD   PATIENT ACCOUNT#:   [de-identified]    LOCATION:  [address]  MEDICAL RECORD #:   [id number]       DATE OF B figure-of-eight fashion around the K-wires and then tightened with a needle . The 2 K-wires were bent and impacted flush, cut short, and C-arm fluoroscopy confirmed excellent alignment of the fracture and placement of the hardware.   The wounds were

## 2020-11-09 ENCOUNTER — TELEPHONE (OUTPATIENT)
Dept: HEMATOLOGY/ONCOLOGY | Facility: HOSPITAL | Age: 70
End: 2020-11-09

## 2020-11-09 NOTE — TELEPHONE ENCOUNTER
Patient Name: Bartolo Cm  YOB: 1950    Referring Provider:  Pradeep Corona       Reason for Referral:  Ms. Laya Darby had genetic testing performed on 10/23/2020 because of a personal history of breast cancer and a family history of cancers. on an annual basis to learn if there have been any updates in genetic testing that would apply or if there are changes in the personal and/or family history. Please do not hesitate to contact my office if you have any questions or concerns, [phone].

## 2020-11-17 ENCOUNTER — OFFICE VISIT (OUTPATIENT)
Dept: INTERNAL MEDICINE CLINIC | Facility: CLINIC | Age: 70
End: 2020-11-17
Payer: COMMERCIAL

## 2020-11-17 VITALS
OXYGEN SATURATION: 100 % | DIASTOLIC BLOOD PRESSURE: 80 MMHG | HEART RATE: 63 BPM | WEIGHT: 150.38 LBS | HEIGHT: 65.5 IN | TEMPERATURE: 98 F | SYSTOLIC BLOOD PRESSURE: 130 MMHG | BODY MASS INDEX: 24.75 KG/M2

## 2020-11-17 DIAGNOSIS — S52.022D CLOSED FRACTURE OF OLECRANON PROCESS OF LEFT ULNA WITH ROUTINE HEALING, SUBSEQUENT ENCOUNTER: ICD-10-CM

## 2020-11-17 DIAGNOSIS — M85.80 OSTEOPENIA, UNSPECIFIED LOCATION: ICD-10-CM

## 2020-11-17 DIAGNOSIS — R04.2 HEMOPTYSIS: ICD-10-CM

## 2020-11-17 DIAGNOSIS — I10 ESSENTIAL HYPERTENSION: ICD-10-CM

## 2020-11-17 DIAGNOSIS — M17.11 PRIMARY OSTEOARTHRITIS OF RIGHT KNEE: ICD-10-CM

## 2020-11-17 DIAGNOSIS — Z00.00 MEDICARE ANNUAL WELLNESS VISIT, SUBSEQUENT: Primary | ICD-10-CM

## 2020-11-17 DIAGNOSIS — Z85.3 HISTORY OF BREAST CANCER: ICD-10-CM

## 2020-11-17 PROCEDURE — 99397 PER PM REEVAL EST PAT 65+ YR: CPT | Performed by: INTERNAL MEDICINE

## 2020-11-17 PROCEDURE — 3079F DIAST BP 80-89 MM HG: CPT | Performed by: INTERNAL MEDICINE

## 2020-11-17 PROCEDURE — G0439 PPPS, SUBSEQ VISIT: HCPCS | Performed by: INTERNAL MEDICINE

## 2020-11-17 PROCEDURE — 96160 PT-FOCUSED HLTH RISK ASSMT: CPT | Performed by: INTERNAL MEDICINE

## 2020-11-17 PROCEDURE — 3008F BODY MASS INDEX DOCD: CPT | Performed by: INTERNAL MEDICINE

## 2020-11-17 PROCEDURE — 3075F SYST BP GE 130 - 139MM HG: CPT | Performed by: INTERNAL MEDICINE

## 2020-11-17 NOTE — PROGRESS NOTES
Nadja Alan is a 78year old female who presents for     6 mo check    L elbow fracture  Tripped on an entrance to a driveway when walking in the dark 10/30/20 --landed on her L elbow.   Was seen in ER–displaced fracture of olecranon process with articula of medial joint space and small joint effusion. Saw Dr Liborio Burdick.    • Visual impairment    • Vitamin D deficiency 2013      Past Surgical History:   Procedure Laterality Date   • CHEMOTHERAPY  1997   • COLONOSCOPY  12/2011   • COLONOSCOPY  03/2019   • D & C lb (69.4 kg)  10/19/20 : 153 lb 6.4 oz (69.6 kg)  06/05/20 : 151 lb (68.5 kg)  11/15/19 : 156 lb (70.8 kg)  08/10/19 : 156 lb (70.8 kg)      General: appears well nourished and in no acute distress  Neck: no adenopathy or thyroid abnormality, no carotid br ua, ucx--ok. Ret in 6 mo. Will be in [facility] Nov to May.      Patient expresses understanding of above issues and plan. This is a 25-30 minute visit and greater than 50% of the time was spent counseling the patient and/or coordinating care.     Current Outp difficulty seeing?: 0-No    Do you have any difficulty walking or getting up?: 0-No    Do you have any tripping hazards?: 0-No    Are you on multiple medications?: 0-No    Does pain affect your day to day activities?: 0-No     Have you had any memory issue Visual Acuity                   Cognitive Assessment     What day of the week is this?: Correct    What month is it?: Correct    What year is it?: Correct    Recall \"Ball\": Correct    Recall \"Flag\": Correct    Recall \"Tree\": Correct        Alexander Jackson on 11/15/19   • PNEUMOCOCCAL IMM (PNEUMOVAX)   Orders placed or performed in visit on 04/03/18   • PNEUMOCOCCAL VACC, 13 KAYLA IM    Update Immunization Activity if applicable    Hepatitis B No orders found for this or any previous visit.  Update Immunization

## 2021-01-07 ENCOUNTER — TELEPHONE (OUTPATIENT)
Dept: INTERNAL MEDICINE CLINIC | Facility: CLINIC | Age: 71
End: 2021-01-07

## 2021-01-07 DIAGNOSIS — M85.80 OSTEOPENIA, UNSPECIFIED LOCATION: Primary | ICD-10-CM

## 2021-01-07 NOTE — TELEPHONE ENCOUNTER
Pt. Is calling to see when her last dexa scan and vitamin D test was done please advise ph.  # [phone]   Routed to clinical

## 2021-01-08 NOTE — TELEPHONE ENCOUNTER
To nursing, DEXA scan order entered. Vitamin D order entered. –Can mail her the order in [place]. Please write on the order the fax number for this office for them to fax the results. Tell patient what she is taking for calcium and vitamin D is fine.   Yun Watson

## 2021-01-08 NOTE — TELEPHONE ENCOUNTER
I spoke with patient and relayed Dr. Post [facility] message. She verbalized understanding. Orders mailed to her at her address in [place].

## 2021-01-25 RX ORDER — TRIAMTERENE AND HYDROCHLOROTHIAZIDE 37.5; 25 MG/1; MG/1
1 TABLET ORAL DAILY
Qty: 90 TABLET | Refills: 3 | Status: SHIPPED | OUTPATIENT
Start: 2021-01-25

## 2021-02-25 ENCOUNTER — ORDER TRANSCRIPTION (OUTPATIENT)
Dept: PHYSICAL THERAPY | Facility: HOSPITAL | Age: 71
End: 2021-02-25

## 2021-02-25 DIAGNOSIS — M67.812 OTHER SPECIFIED DISORDERS OF SYNOVIUM, LEFT SHOULDER: Primary | ICD-10-CM

## 2021-02-25 DIAGNOSIS — S52.022D CLOSED DISPLACED FRACTURE OF OLECRANON PROCESS WITHOUT INTRAARTICULAR EXTENSION OF LEFT ULNA WITH ROUTINE HEALING: Primary | ICD-10-CM

## 2021-03-01 ENCOUNTER — TELEPHONE (OUTPATIENT)
Dept: PHYSICAL THERAPY | Facility: HOSPITAL | Age: 71
End: 2021-03-01

## 2021-03-01 ENCOUNTER — OFFICE VISIT (OUTPATIENT)
Dept: PHYSICAL THERAPY | Age: 71
End: 2021-03-01
Payer: MEDICARE

## 2021-03-01 ENCOUNTER — ORDER TRANSCRIPTION (OUTPATIENT)
Dept: PHYSICAL THERAPY | Facility: HOSPITAL | Age: 71
End: 2021-03-01

## 2021-03-01 DIAGNOSIS — S52.022D DISPLACED FRACTURE OF OLECRANON PROCESS WITHOUT INTRAARTICULAR EXTENSION OF LEFT ULNA, SUBSEQUENT ENCOUNTER FOR CLOSED FRACTURE WITH ROUTINE HEALING: ICD-10-CM

## 2021-03-01 DIAGNOSIS — S52.022A: Primary | ICD-10-CM

## 2021-03-01 PROCEDURE — 97110 THERAPEUTIC EXERCISES: CPT | Performed by: OCCUPATIONAL THERAPIST

## 2021-03-01 PROCEDURE — 97165 OT EVAL LOW COMPLEX 30 MIN: CPT | Performed by: OCCUPATIONAL THERAPIST

## 2021-03-01 NOTE — PROGRESS NOTES
OCCUPATIONAL THERAPY UPPER EXTREMITY EVALUATION   Referring Physician: Dr. Drew Selby  Diagnosis: Closed fracture of olecranon process of left ulna (P01.179Z)  Displaced fracture of olecranon process without intraarticular extension of left ulna, subsequent function for ADL's and work/leisure tasks. Patient presents with difficulty completing  jar lids, bottle caps, opening a door, turning palm all the way up.    In agreement with FOTO score and clinical rationale along with co-mobidities and  past medical hi HEP instruction    X  -A/PROM elbow and forearm                      Therapeutic Activity                      Neuromuscular Re-education                                    Modalities If you have any questions, please contact me at Dept: [phone]    Sincerely,  Electronically signed by therapist: JESUSITA Presley/SUSANNA      [de-identified] certification required: Yes  I certify the need for these services furnished under this plan o

## 2021-03-03 ENCOUNTER — OFFICE VISIT (OUTPATIENT)
Dept: PHYSICAL THERAPY | Age: 71
End: 2021-03-03
Attending: INTERNAL MEDICINE
Payer: MEDICARE

## 2021-03-03 PROCEDURE — 97110 THERAPEUTIC EXERCISES: CPT | Performed by: OCCUPATIONAL THERAPIST

## 2021-03-03 PROCEDURE — 97140 MANUAL THERAPY 1/> REGIONS: CPT | Performed by: OCCUPATIONAL THERAPIST

## 2021-03-03 NOTE — PROGRESS NOTES
Dx: Closed fracture of olecranon process of left ulna (L69.950A)  Displaced fracture of olecranon process without intraarticular extension of left ulna, subsequent encounter for closed fracture with routine healing (S52.402D)     Authorized # of Visits/Ins stretch well after manual work. Plan: Continue therapy 2x/wk to increase function for ADL's. Charges: 1 MT, 2 TE       Total Timed Treatment: 45 min  Total Treatment Time: 45 min    Goals     • Therapy Goals      1.   Pt will be independent and comp

## 2021-03-08 ENCOUNTER — LAB ENCOUNTER (OUTPATIENT)
Dept: LAB | Facility: HOSPITAL | Age: 71
End: 2021-03-08
Attending: INTERNAL MEDICINE
Payer: MEDICARE

## 2021-03-08 ENCOUNTER — HOSPITAL ENCOUNTER (OUTPATIENT)
Dept: MAMMOGRAPHY | Facility: HOSPITAL | Age: 71
Discharge: HOME | End: 2021-03-08
Attending: INTERNAL MEDICINE
Payer: MEDICARE

## 2021-03-08 ENCOUNTER — OFFICE VISIT (OUTPATIENT)
Dept: PHYSICAL THERAPY | Age: 71
End: 2021-03-08
Attending: INTERNAL MEDICINE
Payer: MEDICARE

## 2021-03-08 DIAGNOSIS — Z12.31 VISIT FOR SCREENING MAMMOGRAM: ICD-10-CM

## 2021-03-08 PROCEDURE — 36415 COLL VENOUS BLD VENIPUNCTURE: CPT | Performed by: INTERNAL MEDICINE

## 2021-03-08 PROCEDURE — 82306 VITAMIN D 25 HYDROXY: CPT | Performed by: INTERNAL MEDICINE

## 2021-03-08 PROCEDURE — 77067 SCR MAMMO BI INCL CAD: CPT | Performed by: INTERNAL MEDICINE

## 2021-03-08 PROCEDURE — 97110 THERAPEUTIC EXERCISES: CPT | Performed by: OCCUPATIONAL THERAPIST

## 2021-03-08 PROCEDURE — 77063 BREAST TOMOSYNTHESIS BI: CPT | Performed by: INTERNAL MEDICINE

## 2021-03-08 PROCEDURE — 97140 MANUAL THERAPY 1/> REGIONS: CPT | Performed by: OCCUPATIONAL THERAPIST

## 2021-03-08 NOTE — PROGRESS NOTES
Dx: Closed fracture of olecranon process of left ulna (Q36.605V)  Displaced fracture of olecranon process without intraarticular extension of left ulna, subsequent encounter for closed fracture with routine healing (S52.022D)     Authorized # of Visits/Ins Total Timed Treatment: 45 min  Total Treatment Time: 45 min    Goals     • Therapy Goals      1. Pt will be independent and compliant with comprehensive HEP to maximize progress achieved in OT.   2. Pt complaints of pain will decrease at worst to 0-1/10 i

## 2021-03-10 ENCOUNTER — OFFICE VISIT (OUTPATIENT)
Dept: PHYSICAL THERAPY | Age: 71
End: 2021-03-10
Payer: MEDICARE

## 2021-03-10 LAB — 25(OH)D3 SERPL-MCNC: 34.2 NG/ML (ref 30–100)

## 2021-03-10 PROCEDURE — 97140 MANUAL THERAPY 1/> REGIONS: CPT | Performed by: OCCUPATIONAL THERAPIST

## 2021-03-10 PROCEDURE — 97110 THERAPEUTIC EXERCISES: CPT | Performed by: OCCUPATIONAL THERAPIST

## 2021-03-10 NOTE — PROGRESS NOTES
Dx: Closed fracture of olecranon process of left ulna (G31.339W)  Displaced fracture of olecranon process without intraarticular extension of left ulna, subsequent encounter for closed fracture with routine healing (S52.022D)     Authorized # of Visits/Ins range supination remains challenging however does appear to be improving steadily with each visit. Focus with stretching into supinated position with the elbow in extension and flexion as well as soft tissue work to increase joint mobility.       Plan: Con

## 2021-03-12 ENCOUNTER — TELEPHONE (OUTPATIENT)
Dept: INTERNAL MEDICINE CLINIC | Facility: CLINIC | Age: 71
End: 2021-03-12

## 2021-03-12 NOTE — TELEPHONE ENCOUNTER
I sent patient results note email  Yohan Levin, your mammogram results and vitamin D level results are okay. Dr Zulay Foley. Note to self–  3/8/21–Mammo and vitamin D level ok. Vit D level 34. 2.

## 2021-03-15 ENCOUNTER — OFFICE VISIT (OUTPATIENT)
Dept: PHYSICAL THERAPY | Age: 71
End: 2021-03-15
Payer: MEDICARE

## 2021-03-15 PROCEDURE — 97110 THERAPEUTIC EXERCISES: CPT | Performed by: OCCUPATIONAL THERAPIST

## 2021-03-15 PROCEDURE — 97140 MANUAL THERAPY 1/> REGIONS: CPT | Performed by: OCCUPATIONAL THERAPIST

## 2021-03-15 NOTE — PROGRESS NOTES
Dx: Closed fracture of olecranon process of left ulna (S28.578N)  Displaced fracture of olecranon process without intraarticular extension of left ulna, subsequent encounter for closed fracture with routine healing (S52.022D)     Authorized # of Visits/Ins Reviewed MADIHA use with patient and encouraged her to quinn on device her ending point in order to gauge her progress and this way she will try to increase her stretch with each use, pt demo understanding.   Initiated several new exercises in order to increase

## 2021-03-17 ENCOUNTER — OFFICE VISIT (OUTPATIENT)
Dept: PHYSICAL THERAPY | Age: 71
End: 2021-03-17
Payer: MEDICARE

## 2021-03-17 PROCEDURE — 97110 THERAPEUTIC EXERCISES: CPT | Performed by: OCCUPATIONAL THERAPIST

## 2021-03-17 PROCEDURE — 97140 MANUAL THERAPY 1/> REGIONS: CPT | Performed by: OCCUPATIONAL THERAPIST

## 2021-03-17 NOTE — PROGRESS NOTES
Dx: Closed fracture of olecranon process of left ulna (T28.292S)  Displaced fracture of olecranon process without intraarticular extension of left ulna, subsequent encounter for closed fracture with routine healing (S52.022D)     Authorized # of Visits/Ins Modalities           *History of breast cancer*  Hot pack 5' elbow  L  5' Left elbow 5' Left elbow with weighted stretch 5' Left elbow with weighted stretch 5' left elbow with weight                    Assessment: Improved tolerance for elbow ext

## 2021-03-22 ENCOUNTER — APPOINTMENT (OUTPATIENT)
Dept: PHYSICAL THERAPY | Age: 71
End: 2021-03-22
Payer: MEDICARE

## 2021-03-24 ENCOUNTER — OFFICE VISIT (OUTPATIENT)
Dept: PHYSICAL THERAPY | Age: 71
End: 2021-03-24
Payer: MEDICARE

## 2021-03-24 PROCEDURE — 97140 MANUAL THERAPY 1/> REGIONS: CPT | Performed by: OCCUPATIONAL THERAPIST

## 2021-03-24 PROCEDURE — 97110 THERAPEUTIC EXERCISES: CPT | Performed by: OCCUPATIONAL THERAPIST

## 2021-03-24 NOTE — PROGRESS NOTES
Dx: Closed fracture of olecranon process of left ulna (T31.715V)  Displaced fracture of olecranon process without intraarticular extension of left ulna, subsequent encounter for closed fracture with routine healing (S52.022D)     Authorized # of Visits/Ins HEP instruction    X  -A/PROM elbow and forearm  reviewed         Forearm supination   15/135 elbow ext/flex  65/75 s/p  70-sup 70-sup 75 -sup    Therapeutic Activity                      Neuromuscular Re-education

## 2021-03-29 ENCOUNTER — OFFICE VISIT (OUTPATIENT)
Dept: PHYSICAL THERAPY | Age: 71
End: 2021-03-29
Payer: MEDICARE

## 2021-03-29 PROCEDURE — 97140 MANUAL THERAPY 1/> REGIONS: CPT | Performed by: OCCUPATIONAL THERAPIST

## 2021-03-29 PROCEDURE — 97110 THERAPEUTIC EXERCISES: CPT | Performed by: OCCUPATIONAL THERAPIST

## 2021-03-29 NOTE — PROGRESS NOTES
Dx: Closed fracture of olecranon process of left ulna (R24.726S)  Displaced fracture of olecranon process without intraarticular extension of left ulna, subsequent encounter for closed fracture with routine healing (S52.022D)     Authorized # of Visits/Ins Weight well pro/sup   3# x2ea 3# x2ea 3# x2ea Bucket carry 8# 2'x1 Green TB tricep extension two plane 2x10 Supine free weight 3# tricep ext x10   Orange putty    and supinate x10 Supine- tricep 2# x10 3# x10 x10 2# with forearm rotation  Green putty increase to 140 degrees in order to complete UE dressing with greater ease.              General Pankaj MSOT, OTR/L

## 2021-03-31 ENCOUNTER — OFFICE VISIT (OUTPATIENT)
Dept: PHYSICAL THERAPY | Age: 71
End: 2021-03-31
Payer: MEDICARE

## 2021-03-31 PROCEDURE — 97110 THERAPEUTIC EXERCISES: CPT | Performed by: OCCUPATIONAL THERAPIST

## 2021-03-31 PROCEDURE — 97140 MANUAL THERAPY 1/> REGIONS: CPT | Performed by: OCCUPATIONAL THERAPIST

## 2021-03-31 NOTE — PROGRESS NOTES
Dx: Closed fracture of olecranon process of left ulna (D31.194Q)  Displaced fracture of olecranon process without intraarticular extension of left ulna, subsequent encounter for closed fracture with routine healing (S52.022D)     Authorized # of Visits/Ins Body blade 2'x1 2'x1      4'' stair step up UE x10ea direction in quadruped Clothes pins with sponges for forearm supination x1 whole container TB tricep  -shoulder at 90 and elbow extension x15 Green putty   , pinch and pull into supination x10   Joelle Ward ADL's.    Charges: 1 MT, 2 TE       Total Timed Treatment: 45 min  Total Treatment Time: 45 min    Goals     • Therapy Goals      1. Pt will be independent and compliant with comprehensive HEP to maximize progress achieved in OT. -on going  2.  Pt complain

## 2021-04-05 ENCOUNTER — OFFICE VISIT (OUTPATIENT)
Dept: PHYSICAL THERAPY | Age: 71
End: 2021-04-05
Attending: ORTHOPAEDIC SURGERY
Payer: MEDICARE

## 2021-04-05 PROCEDURE — 97110 THERAPEUTIC EXERCISES: CPT | Performed by: OCCUPATIONAL THERAPIST

## 2021-04-05 PROCEDURE — 97140 MANUAL THERAPY 1/> REGIONS: CPT | Performed by: OCCUPATIONAL THERAPIST

## 2021-04-05 NOTE — PROGRESS NOTES
Dx: Closed fracture of olecranon process of left ulna (L31.746Y)  Displaced fracture of olecranon process without intraarticular extension of left ulna, subsequent encounter for closed fracture with routine healing (S52.022D)     Authorized # of Visits/Ins step  Tilt board  2' each Weight bearing with 4\" step 1 knee on airex pad x15   Clothes pins with sponges for FA pro/sup  Green x1 whole bucket GTB bicep/tricep x10 Tilt board in quadruped position 2' Body blade 2'x1 2'x1      4'' stair step up UE x10ea d needed and will evaluate for readiness of discharge at that time. Plan: Continue therapy 1-2x/wk to increase function for ADL's.     Charges: 1 MT, 2 TE       Total Timed Treatment: 45 min  Total Treatment Time: 45 min    Goals     • Therapy Goals

## 2021-04-06 ENCOUNTER — HOSPITAL ENCOUNTER (OUTPATIENT)
Dept: BONE DENSITY | Facility: HOSPITAL | Age: 71
Discharge: HOME | End: 2021-04-06
Attending: INTERNAL MEDICINE
Payer: MEDICARE

## 2021-04-06 ENCOUNTER — TELEPHONE (OUTPATIENT)
Dept: INTERNAL MEDICINE CLINIC | Facility: CLINIC | Age: 71
End: 2021-04-06

## 2021-04-06 DIAGNOSIS — M85.80 OSTEOPENIA, UNSPECIFIED LOCATION: ICD-10-CM

## 2021-04-06 PROCEDURE — 77080 DXA BONE DENSITY AXIAL: CPT | Performed by: INTERNAL MEDICINE

## 2021-04-06 NOTE — TELEPHONE ENCOUNTER
To nursing, please tell patient   DEXA scan 4/6/21 shows no osteoporosis. There is some early bone thinning, common in her age group called osteopenia. Continue calcium and vitamin D and weightbearing exercises such as walking. See me 5/3/21 as planned.

## 2021-04-19 ENCOUNTER — OFFICE VISIT (OUTPATIENT)
Dept: PHYSICAL THERAPY | Age: 71
End: 2021-04-19
Attending: ORTHOPAEDIC SURGERY
Payer: MEDICARE

## 2021-04-19 PROCEDURE — 97140 MANUAL THERAPY 1/> REGIONS: CPT | Performed by: OCCUPATIONAL THERAPIST

## 2021-04-19 PROCEDURE — 97110 THERAPEUTIC EXERCISES: CPT | Performed by: OCCUPATIONAL THERAPIST

## 2021-04-19 NOTE — PROGRESS NOTES
Dx: Closed fracture of olecranon process of left ulna (W15.824X)  Displaced fracture of olecranon process without intraarticular extension of left ulna, subsequent encounter for closed fracture with routine healing (S52.022D)     Authorized # of Visits/Ins FW 4# x2ea Weight bearing plank position and quadruped  4\" step  Tilt board  2' each Weight bearing with 4\" step 1 knee on airex pad x15 Bucket carry 7# 2'x1   Clothes pins with sponges for FA pro/sup  Green x1 whole bucket GTB bicep/tricep x10 Tilt boar 40#  Supination- 85 degrees      Plan: Discharge from therapy and continue with HEP independently. Charges: 1 MT, 2 TE       Total Timed Treatment: 45 min  Total Treatment Time: 45 min    Goals     • Therapy Goals      1.   Pt will be independent and c

## 2021-05-03 ENCOUNTER — OFFICE VISIT (OUTPATIENT)
Dept: INTERNAL MEDICINE CLINIC | Facility: CLINIC | Age: 71
End: 2021-05-03
Payer: COMMERCIAL

## 2021-05-03 VITALS
SYSTOLIC BLOOD PRESSURE: 126 MMHG | BODY MASS INDEX: 25.52 KG/M2 | HEIGHT: 65.5 IN | HEART RATE: 60 BPM | OXYGEN SATURATION: 97 % | DIASTOLIC BLOOD PRESSURE: 82 MMHG | TEMPERATURE: 98 F | WEIGHT: 155 LBS

## 2021-05-03 DIAGNOSIS — R04.2 HEMOPTYSIS: ICD-10-CM

## 2021-05-03 DIAGNOSIS — Z85.3 HISTORY OF BREAST CANCER: ICD-10-CM

## 2021-05-03 DIAGNOSIS — I10 ESSENTIAL HYPERTENSION: ICD-10-CM

## 2021-05-03 DIAGNOSIS — M17.11 PRIMARY OSTEOARTHRITIS OF RIGHT KNEE: ICD-10-CM

## 2021-05-03 DIAGNOSIS — M70.22 OLECRANON BURSITIS OF LEFT ELBOW: Primary | ICD-10-CM

## 2021-05-03 PROCEDURE — 3074F SYST BP LT 130 MM HG: CPT | Performed by: INTERNAL MEDICINE

## 2021-05-03 PROCEDURE — 3008F BODY MASS INDEX DOCD: CPT | Performed by: INTERNAL MEDICINE

## 2021-05-03 PROCEDURE — 99214 OFFICE O/P EST MOD 30 MIN: CPT | Performed by: INTERNAL MEDICINE

## 2021-05-03 PROCEDURE — 3079F DIAST BP 80-89 MM HG: CPT | Performed by: INTERNAL MEDICINE

## 2021-05-03 NOTE — PROGRESS NOTES
Ebonie Ellis is a 78year old female who presents for     6 mo check  Came back from [facility] in Feb to help her daughter w hip labrum surgery. .   Had both covid shots in January 2021. Hx L elbow fracture S/p  L olecranon fx ORIF 11/5/20--Dr. Liborio Burdick.  Heal 03/2019   • D & C  1998    Hysteroscopy/D&C   • DILATION/CURETTAGE,DIAGNOSTIC     • ELBOW SURGERY Left 11/05/2020    ORIF L olecranon fracture 11/5/20 -Dr Olivia Coles.     • ELECTROCARDIOGRAM, COMPLETE  04-    Scanned to media tab 04-   • HYSTERO rhythm, S1S2 normal without murmur  Breasts: no mass or axillary adenopathy  Abdomen: soft, nontender without mass or hepatosplenomegaly  Extremities: no edema  L elbow--nontender bursal swelling at tip of L elbow. Also can feel pin within the bursa.    No documentation.      - CBC WITH DIFFERENTIAL WITH PLATELET  - COMP METABOLIC PANEL (14)  - LIPID PANEL  - TSH W REFLEX TO FREE T4  - URINALYSIS WITH CULTURE REFLEX        Current Outpatient Medications   Medication Sig Dispense Refill   • Triamterene-HCTZ 3

## 2021-05-24 ENCOUNTER — LAB ENCOUNTER (OUTPATIENT)
Dept: LAB | Facility: HOSPITAL | Age: 71
End: 2021-05-24
Attending: ORTHOPAEDIC SURGERY
Payer: MEDICARE

## 2021-05-24 ENCOUNTER — OFFICE VISIT (OUTPATIENT)
Dept: OTOLARYNGOLOGY | Facility: CLINIC | Age: 71
End: 2021-05-24
Payer: COMMERCIAL

## 2021-05-24 VITALS
TEMPERATURE: 98 F | BODY MASS INDEX: 25.52 KG/M2 | HEIGHT: 65.5 IN | DIASTOLIC BLOOD PRESSURE: 71 MMHG | SYSTOLIC BLOOD PRESSURE: 121 MMHG | WEIGHT: 155 LBS

## 2021-05-24 DIAGNOSIS — R07.0 THROAT PAIN: Primary | ICD-10-CM

## 2021-05-24 DIAGNOSIS — Z01.818 PREOP TESTING: ICD-10-CM

## 2021-05-24 PROCEDURE — 3008F BODY MASS INDEX DOCD: CPT | Performed by: OTOLARYNGOLOGY

## 2021-05-24 PROCEDURE — 99213 OFFICE O/P EST LOW 20 MIN: CPT | Performed by: OTOLARYNGOLOGY

## 2021-05-24 PROCEDURE — 3078F DIAST BP <80 MM HG: CPT | Performed by: OTOLARYNGOLOGY

## 2021-05-24 PROCEDURE — 3074F SYST BP LT 130 MM HG: CPT | Performed by: OTOLARYNGOLOGY

## 2021-05-24 PROCEDURE — 31575 DIAGNOSTIC LARYNGOSCOPY: CPT | Performed by: OTOLARYNGOLOGY

## 2021-05-25 NOTE — PROGRESS NOTES
Vincent Griggs is a 78year old female. Patient presents with:  Throat Problem: pt presents today with throat problem, feels like something stuck in throat, and having constant headaches.     HPI:   She has been experiencing a feeling of something sticking i denies headaches    EXAM:   /71 (BP Location: Right arm, Patient Position: Sitting, Cuff Size: adult)   Temp 98.2 °F (36.8 °C) (Tympanic)   Ht 5' 5.5\" (1.664 m)   Wt 155 lb (70.3 kg)   BMI 25.40 kg/m²   System Findings Details   Skin Normal Inspecti normal.  Arytenoids:  Bilateral: Arytenoids are normal.  Vocal folds-false  Bilateral: Vocal folds (false) are normal.  Vocal folds-true  Bilateral: Vocal folds (true) are normal.  Pyriform sinus:  Bilateral: Pyriform sinuses are normal.  Base of tongue is

## 2021-05-27 ENCOUNTER — ANESTHESIA EVENT (OUTPATIENT)
Dept: SURGERY | Facility: HOSPITAL | Age: 71
End: 2021-05-27
Payer: MEDICARE

## 2021-05-27 ENCOUNTER — HOSPITAL ENCOUNTER (OUTPATIENT)
Facility: HOSPITAL | Age: 71
Setting detail: HOSPITAL OUTPATIENT SURGERY
Discharge: HOME | End: 2021-05-27
Attending: ORTHOPAEDIC SURGERY | Admitting: ORTHOPAEDIC SURGERY
Payer: MEDICARE

## 2021-05-27 ENCOUNTER — ANESTHESIA (OUTPATIENT)
Dept: SURGERY | Facility: HOSPITAL | Age: 71
End: 2021-05-27
Payer: MEDICARE

## 2021-05-27 VITALS
SYSTOLIC BLOOD PRESSURE: 122 MMHG | OXYGEN SATURATION: 100 % | RESPIRATION RATE: 18 BRPM | DIASTOLIC BLOOD PRESSURE: 56 MMHG | BODY MASS INDEX: 25.19 KG/M2 | TEMPERATURE: 97 F | HEART RATE: 55 BPM | HEIGHT: 65.5 IN | WEIGHT: 153 LBS

## 2021-05-27 DIAGNOSIS — Z01.818 PREOP TESTING: Primary | ICD-10-CM

## 2021-05-27 PROBLEM — T84.84XA PAINFUL ORTHOPAEDIC HARDWARE: Status: ACTIVE | Noted: 2021-05-27

## 2021-05-27 PROBLEM — T84.84XA PAINFUL ORTHOPAEDIC HARDWARE (HCC): Status: ACTIVE | Noted: 2021-05-27

## 2021-05-27 PROCEDURE — 88300 SURGICAL PATH GROSS: CPT | Performed by: ORTHOPAEDIC SURGERY

## 2021-05-27 PROCEDURE — 0RPM04Z REMOVAL OF INTERNAL FIXATION DEVICE FROM LEFT ELBOW JOINT, OPEN APPROACH: ICD-10-PCS | Performed by: ORTHOPAEDIC SURGERY

## 2021-05-27 PROCEDURE — 88304 TISSUE EXAM BY PATHOLOGIST: CPT | Performed by: ORTHOPAEDIC SURGERY

## 2021-05-27 PROCEDURE — 0MB40ZZ EXCISION OF LEFT ELBOW BURSA AND LIGAMENT, OPEN APPROACH: ICD-10-PCS | Performed by: ORTHOPAEDIC SURGERY

## 2021-05-27 RX ORDER — PROCHLORPERAZINE EDISYLATE 5 MG/ML
5 INJECTION INTRAMUSCULAR; INTRAVENOUS ONCE AS NEEDED
Status: DISCONTINUED | OUTPATIENT
Start: 2021-05-27 | End: 2021-05-27

## 2021-05-27 RX ORDER — ACETAMINOPHEN 500 MG
1000 TABLET ORAL ONCE
Status: COMPLETED | OUTPATIENT
Start: 2021-05-27 | End: 2021-05-27

## 2021-05-27 RX ORDER — GLYCOPYRROLATE 0.2 MG/ML
INJECTION, SOLUTION INTRAMUSCULAR; INTRAVENOUS AS NEEDED
Status: DISCONTINUED | OUTPATIENT
Start: 2021-05-27 | End: 2021-05-27 | Stop reason: SURG

## 2021-05-27 RX ORDER — ONDANSETRON 2 MG/ML
INJECTION INTRAMUSCULAR; INTRAVENOUS AS NEEDED
Status: DISCONTINUED | OUTPATIENT
Start: 2021-05-27 | End: 2021-05-27 | Stop reason: SURG

## 2021-05-27 RX ORDER — HYDROCODONE BITARTRATE AND ACETAMINOPHEN 5; 325 MG/1; MG/1
1 TABLET ORAL AS NEEDED
Status: DISCONTINUED | OUTPATIENT
Start: 2021-05-27 | End: 2021-05-27

## 2021-05-27 RX ORDER — HYDROCODONE BITARTRATE AND ACETAMINOPHEN 5; 325 MG/1; MG/1
1-2 TABLET ORAL EVERY 6 HOURS PRN
Qty: 10 TABLET | Refills: 0 | Status: SHIPPED | OUTPATIENT
Start: 2021-05-27 | End: 2021-11-09 | Stop reason: ALTCHOICE

## 2021-05-27 RX ORDER — HYDROCODONE BITARTRATE AND ACETAMINOPHEN 5; 325 MG/1; MG/1
2 TABLET ORAL AS NEEDED
Status: DISCONTINUED | OUTPATIENT
Start: 2021-05-27 | End: 2021-05-27

## 2021-05-27 RX ORDER — HYDROMORPHONE HYDROCHLORIDE 1 MG/ML
0.4 INJECTION, SOLUTION INTRAMUSCULAR; INTRAVENOUS; SUBCUTANEOUS EVERY 5 MIN PRN
Status: DISCONTINUED | OUTPATIENT
Start: 2021-05-27 | End: 2021-05-27

## 2021-05-27 RX ORDER — SODIUM CHLORIDE, SODIUM LACTATE, POTASSIUM CHLORIDE, CALCIUM CHLORIDE 600; 310; 30; 20 MG/100ML; MG/100ML; MG/100ML; MG/100ML
INJECTION, SOLUTION INTRAVENOUS CONTINUOUS
Status: DISCONTINUED | OUTPATIENT
Start: 2021-05-27 | End: 2021-05-27

## 2021-05-27 RX ORDER — BUPIVACAINE HYDROCHLORIDE 5 MG/ML
INJECTION, SOLUTION EPIDURAL; INTRACAUDAL AS NEEDED
Status: DISCONTINUED | OUTPATIENT
Start: 2021-05-27 | End: 2021-05-27 | Stop reason: HOSPADM

## 2021-05-27 RX ORDER — MORPHINE SULFATE 4 MG/ML
4 INJECTION, SOLUTION INTRAMUSCULAR; INTRAVENOUS EVERY 10 MIN PRN
Status: DISCONTINUED | OUTPATIENT
Start: 2021-05-27 | End: 2021-05-27

## 2021-05-27 RX ORDER — HYDROMORPHONE HYDROCHLORIDE 1 MG/ML
0.2 INJECTION, SOLUTION INTRAMUSCULAR; INTRAVENOUS; SUBCUTANEOUS EVERY 5 MIN PRN
Status: DISCONTINUED | OUTPATIENT
Start: 2021-05-27 | End: 2021-05-27

## 2021-05-27 RX ORDER — DEXAMETHASONE SODIUM PHOSPHATE 4 MG/ML
VIAL (ML) INJECTION AS NEEDED
Status: DISCONTINUED | OUTPATIENT
Start: 2021-05-27 | End: 2021-05-27 | Stop reason: SURG

## 2021-05-27 RX ORDER — CEFAZOLIN SODIUM/WATER 2 G/20 ML
2 SYRINGE (ML) INTRAVENOUS ONCE
Status: COMPLETED | OUTPATIENT
Start: 2021-05-27 | End: 2021-05-27

## 2021-05-27 RX ORDER — NALOXONE HYDROCHLORIDE 0.4 MG/ML
80 INJECTION, SOLUTION INTRAMUSCULAR; INTRAVENOUS; SUBCUTANEOUS AS NEEDED
Status: DISCONTINUED | OUTPATIENT
Start: 2021-05-27 | End: 2021-05-27

## 2021-05-27 RX ORDER — MORPHINE SULFATE 4 MG/ML
2 INJECTION, SOLUTION INTRAMUSCULAR; INTRAVENOUS EVERY 10 MIN PRN
Status: DISCONTINUED | OUTPATIENT
Start: 2021-05-27 | End: 2021-05-27

## 2021-05-27 RX ORDER — EPHEDRINE SULFATE 50 MG/ML
INJECTION INTRAVENOUS AS NEEDED
Status: DISCONTINUED | OUTPATIENT
Start: 2021-05-27 | End: 2021-05-27 | Stop reason: SURG

## 2021-05-27 RX ORDER — LIDOCAINE HYDROCHLORIDE 10 MG/ML
INJECTION, SOLUTION EPIDURAL; INFILTRATION; INTRACAUDAL; PERINEURAL AS NEEDED
Status: DISCONTINUED | OUTPATIENT
Start: 2021-05-27 | End: 2021-05-27 | Stop reason: SURG

## 2021-05-27 RX ORDER — ONDANSETRON 2 MG/ML
4 INJECTION INTRAMUSCULAR; INTRAVENOUS ONCE AS NEEDED
Status: DISCONTINUED | OUTPATIENT
Start: 2021-05-27 | End: 2021-05-27

## 2021-05-27 RX ORDER — MORPHINE SULFATE 10 MG/ML
6 INJECTION, SOLUTION INTRAMUSCULAR; INTRAVENOUS EVERY 10 MIN PRN
Status: DISCONTINUED | OUTPATIENT
Start: 2021-05-27 | End: 2021-05-27

## 2021-05-27 RX ORDER — HYDROMORPHONE HYDROCHLORIDE 1 MG/ML
0.6 INJECTION, SOLUTION INTRAMUSCULAR; INTRAVENOUS; SUBCUTANEOUS EVERY 5 MIN PRN
Status: DISCONTINUED | OUTPATIENT
Start: 2021-05-27 | End: 2021-05-27

## 2021-05-27 RX ADMIN — GLYCOPYRROLATE 0.2 MG: 0.2 INJECTION, SOLUTION INTRAMUSCULAR; INTRAVENOUS at 11:21:00

## 2021-05-27 RX ADMIN — DEXAMETHASONE SODIUM PHOSPHATE 4 MG: 4 MG/ML VIAL (ML) INJECTION at 11:13:00

## 2021-05-27 RX ADMIN — EPHEDRINE SULFATE 10 MG: 50 INJECTION INTRAVENOUS at 11:23:00

## 2021-05-27 RX ADMIN — LIDOCAINE HYDROCHLORIDE 50 MG: 10 INJECTION, SOLUTION EPIDURAL; INFILTRATION; INTRACAUDAL; PERINEURAL at 11:15:00

## 2021-05-27 RX ADMIN — SODIUM CHLORIDE, SODIUM LACTATE, POTASSIUM CHLORIDE, CALCIUM CHLORIDE: 600; 310; 30; 20 INJECTION, SOLUTION INTRAVENOUS at 12:01:00

## 2021-05-27 RX ADMIN — CEFAZOLIN SODIUM/WATER 2 G: 2 G/20 ML SYRINGE (ML) INTRAVENOUS at 11:11:00

## 2021-05-27 RX ADMIN — ONDANSETRON 4 MG: 2 INJECTION INTRAMUSCULAR; INTRAVENOUS at 11:13:00

## 2021-05-27 RX ADMIN — SODIUM CHLORIDE, SODIUM LACTATE, POTASSIUM CHLORIDE, CALCIUM CHLORIDE: 600; 310; 30; 20 INJECTION, SOLUTION INTRAVENOUS at 11:24:00

## 2021-05-27 NOTE — ANESTHESIA PREPROCEDURE EVALUATION
Anesthesia PreOp Note    HPI:     Mike Harp is a 78year old female who presents for preoperative consultation requested by: Nicole Darby MD    Date of Surgery: 5/27/2021    Procedure(s):  left elbow bursectomy, left elbow pin removal  ELBOW BURSECT 03/2019   • D & C  1998    Hysteroscopy/D&C   • DILATION/CURETTAGE,DIAGNOSTIC     • ELBOW SURGERY Left 11/05/2020    ORIF L olecranon fracture 11/5/20 -Dr Jen Tse.     • ELECTROCARDIOGRAM, COMPLETE  04-    Scanned to media tab 04-   • HYSTERO Asked        Blood Transfusions: Not Asked        Caffeine Concern: Yes          coffee, 2 cups daily (No per NG ECD)        Occupational Exposure: Not Asked        Hobby Hazards: Not Asked        Sleep Concern: Not Asked        Stress Concern: Not Asked Patient summary reviewed and Nursing notes reviewed    Airway   Mallampati: I  Dental          Pulmonary - negative ROS    breath sounds clear to auscultation  Cardiovascular   Exercise tolerance: good  (+) hypertension,     Rhythm: regular  Rate: normal

## 2021-05-27 NOTE — ANESTHESIA PROCEDURE NOTES
Airway  Urgency: Elective      General Information and Staff    Patient location during procedure: OR  Anesthesiologist: Lucia Hendricks MD  Performed: anesthesiologist     Indications and Patient Condition  Indications for airway management: anesthesia

## 2021-05-27 NOTE — H&P
[facility] Patient Status:  Hospital Outpatient Surgery    10/30/1950 MRN [id number]   Location [facility] PRE OP RECOVERY Attending Ravinder Patton MD   Hosp Day # 0 Laura Jauregui Problem Relation Age of Onset   • Dementia Brother 68   • Alcohol and Other Disorders Associated Father         Alcoholism   • Cancer Father         Cancer-throat-- age 64 (cause of death)   • Depression Father    • Heart Disorder Mother          olecranon bursa with palbable hardware  Skin: Normal texture and turgor. Lymphatic:  No palpable cervical lymphadenopathy. Neurologic: Cranial nerves are grossly intact.   Motor strength and sensory examination is grossly normal.  No focal neurologic defi

## 2021-05-27 NOTE — BRIEF OP NOTE
Pre-Operative Diagnosis: elbow bursitis     Post-Operative Diagnosis: * No post-op diagnosis entered *      Procedure Performed:   left elbow bursectomy, left elbow pin removal    Surgeon(s) and Role:     Ivana Arzola MD - Primary    Assistant(s):

## 2021-05-27 NOTE — ANESTHESIA POSTPROCEDURE EVALUATION
Patient: Maria Antonia Carrillo    Procedure Summary     Date: 05/27/21 Room / Location: [address] [place] [address] MAIN OR 12 / [address] [place] [address] MAIN OR    Anesthesia Start: 9903 Anesthesia Stop: 5490    Procedures:       left elbow bursectomy, left elbow pin removal (Left )      ELBOW BURSECTOMY (L

## 2021-05-28 NOTE — OPERATIVE REPORT
[facility]    PATIENT'S NAME: Josep Stephens   ATTENDING PHYSICIAN: Terri Orellana MD   OPERATING PHYSICIAN: Terri Orellana MD   PATIENT ACCOUNT#:   [de-identified]    LOCATION:  [place] [address]  MEDICAL RECORD #:   [id number]       DATE OF

## 2021-09-23 ENCOUNTER — OFFICE VISIT (OUTPATIENT)
Dept: INTERNAL MEDICINE CLINIC | Facility: CLINIC | Age: 71
End: 2021-09-23
Payer: COMMERCIAL

## 2021-09-23 ENCOUNTER — LAB ENCOUNTER (OUTPATIENT)
Dept: LAB | Age: 71
End: 2021-09-23
Attending: INTERNAL MEDICINE
Payer: MEDICARE

## 2021-09-23 VITALS
HEART RATE: 56 BPM | TEMPERATURE: 98 F | OXYGEN SATURATION: 98 % | HEIGHT: 66 IN | WEIGHT: 157.31 LBS | DIASTOLIC BLOOD PRESSURE: 58 MMHG | RESPIRATION RATE: 16 BRPM | SYSTOLIC BLOOD PRESSURE: 126 MMHG | BODY MASS INDEX: 25.28 KG/M2

## 2021-09-23 DIAGNOSIS — N30.01 ACUTE CYSTITIS WITH HEMATURIA: Primary | ICD-10-CM

## 2021-09-23 LAB
BILIRUB UR QL: NEGATIVE
COLOR UR: YELLOW
GLUCOSE UR-MCNC: NEGATIVE MG/DL
KETONES UR-MCNC: NEGATIVE MG/DL
NITRITE UR QL STRIP.AUTO: NEGATIVE
PH UR: 6 [PH] (ref 5–8)
PROT UR-MCNC: 30 MG/DL
SP GR UR STRIP: 1.02 (ref 1–1.03)
UROBILINOGEN UR STRIP-ACNC: <2
WBC #/AREA URNS AUTO: >50 /HPF

## 2021-09-23 PROCEDURE — 99213 OFFICE O/P EST LOW 20 MIN: CPT | Performed by: INTERNAL MEDICINE

## 2021-09-23 PROCEDURE — 87086 URINE CULTURE/COLONY COUNT: CPT | Performed by: INTERNAL MEDICINE

## 2021-09-23 PROCEDURE — 3074F SYST BP LT 130 MM HG: CPT | Performed by: INTERNAL MEDICINE

## 2021-09-23 PROCEDURE — 81001 URINALYSIS AUTO W/SCOPE: CPT | Performed by: INTERNAL MEDICINE

## 2021-09-23 PROCEDURE — 3008F BODY MASS INDEX DOCD: CPT | Performed by: INTERNAL MEDICINE

## 2021-09-23 PROCEDURE — 3078F DIAST BP <80 MM HG: CPT | Performed by: INTERNAL MEDICINE

## 2021-09-23 RX ORDER — NITROFURANTOIN 25; 75 MG/1; MG/1
100 CAPSULE ORAL 2 TIMES DAILY
Qty: 10 CAPSULE | Refills: 0 | Status: SHIPPED | OUTPATIENT
Start: 2021-09-23 | End: 2021-11-09 | Stop reason: ALTCHOICE

## 2021-09-23 NOTE — PROGRESS NOTES
Duglas Khanna is a 78year old female. Patient presents with:  Checkup: UTI symptoms: pressure, frequency, hematuria, dysuria X 3 days    HPI:     Mild dysuria, small amount of pink in urine, frequency in 3 days. Had UTI about a year ago.       Current Out diarrhea    Wt Readings from Last 5 Encounters:  09/23/21 : 157 lb 4.8 oz (71.4 kg)  05/27/21 : 153 lb (69.4 kg)  05/24/21 : 155 lb (70.3 kg)  05/03/21 : 155 lb (70.3 kg)  11/17/20 : 150 lb 6.4 oz (68.2 kg)    Body mass index is 25.39 kg/m².       EXAM:   B

## 2021-09-27 ENCOUNTER — TELEPHONE (OUTPATIENT)
Dept: INTERNAL MEDICINE CLINIC | Facility: CLINIC | Age: 71
End: 2021-09-27

## 2021-09-27 DIAGNOSIS — N30.01 ACUTE CYSTITIS WITH HEMATURIA: Primary | ICD-10-CM

## 2021-09-27 NOTE — TELEPHONE ENCOUNTER
To nursing,  urinalysis is consistent with urinary tract infection. Urine culture grew bacteria consistent with UTI. Lien Costa Finish the [employer]. Repeat urinalysis and urine culture in 2 weeks. Lab order entered. Thanks.       Note to self   pt saw Dr Miguel Avendaño

## 2021-10-06 ENCOUNTER — LAB ENCOUNTER (OUTPATIENT)
Dept: LAB | Age: 71
End: 2021-10-06
Attending: INTERNAL MEDICINE
Payer: MEDICARE

## 2021-10-06 ENCOUNTER — TELEPHONE (OUTPATIENT)
Dept: INTERNAL MEDICINE CLINIC | Facility: CLINIC | Age: 71
End: 2021-10-06

## 2021-10-06 DIAGNOSIS — I10 HTN (HYPERTENSION): Primary | ICD-10-CM

## 2021-10-06 PROCEDURE — 81003 URINALYSIS AUTO W/O SCOPE: CPT | Performed by: INTERNAL MEDICINE

## 2021-10-06 PROCEDURE — 87086 URINE CULTURE/COLONY COUNT: CPT | Performed by: INTERNAL MEDICINE

## 2021-10-12 NOTE — TELEPHONE ENCOUNTER
Spoke with patient and relayed Dr. Kenisha Cuevas' message re: lab results. Patient verbalized understanding and agrees to plan. She states she plans to have annual labs done prior to upcoming Medicare annual physical. Labs orders reviewed with her.  Appt date r

## 2021-10-12 NOTE — TELEPHONE ENCOUNTER
To nursing, do lab before 11/9/21 visit–CBC CMP TSH lipid. These lab orders were already entered on 5/3/21. She does not need to repeat urinalysis–I removed the duplicate UA order.   Thanks

## 2021-10-13 NOTE — TELEPHONE ENCOUNTER
MD message noted [facility] have annual [facility] done prior to upcoming Medicare annual physical.

## 2021-10-29 ENCOUNTER — LAB ENCOUNTER (OUTPATIENT)
Dept: LAB | Facility: HOSPITAL | Age: 71
End: 2021-10-29
Attending: INTERNAL MEDICINE
Payer: MEDICARE

## 2021-10-29 PROCEDURE — 85025 COMPLETE CBC W/AUTO DIFF WBC: CPT | Performed by: INTERNAL MEDICINE

## 2021-10-29 PROCEDURE — 80053 COMPREHEN METABOLIC PANEL: CPT | Performed by: INTERNAL MEDICINE

## 2021-10-29 PROCEDURE — 36415 COLL VENOUS BLD VENIPUNCTURE: CPT | Performed by: INTERNAL MEDICINE

## 2021-10-29 PROCEDURE — 80061 LIPID PANEL: CPT | Performed by: INTERNAL MEDICINE

## 2021-10-29 PROCEDURE — 84443 ASSAY THYROID STIM HORMONE: CPT | Performed by: INTERNAL MEDICINE

## 2021-11-08 NOTE — PROGRESS NOTES
Hung Boyd is a 73year old female who presents for     6 mo check and Medicare annual    Feels good. Ran 8 miles last wk in the [employer] run. Fingernails crack for 2 yrs. . discusssed option to see derm.     pt saw Dr Oliver Arana here 9/23/21 UTI sy COMPLETE  04-    Scanned to media tab 04-   • HYSTEROSCOPY  1998    Hysteroscopy/D&C   • LUMPECTOMY LEFT  1997   • RADIATION LEFT  1997      Family History   Problem Relation Age of Onset   • Dementia Brother 68   • Alcohol and Other Disorder Repeat laryngoscopy 8/10/19 Dr. Janey Miller, erythema larynx.  Was to ret--declines to return.     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 Seeing Dr Jeff Montiel MRI per Dr Victoria Aragon in 10/2020 showed meniscal well-being?: Social Interaction; Visiting Friends; Visiting Family    If you are a male age 38-65 or a female age 47-67, do you take aspirin?: No    Have you had any immunizations at another office such as Influenza, Hepatitis B, Tetanus, or Pneumococcal?: N Fasting Blood Sugar (FSB)Annually Glucose (mg/dL)   Date Value   10/29/2021 79    No flowsheet data found.    Cardiovascular Disease Screening     LDL Annually LDL Cholesterol (mg/dL)   Date Value   10/29/2021 89        EKG One Time       Colorectal Cancer DISEASE MONITORING Internal Lab or Procedure External Lab or Procedure   Annual Monitoring of Persistent     Medications (ACE/ARB, digoxin, diuretics)    Potassium  Annually Potassium (mmol/L)   Date Value   10/29/2021 4.0     POTASSIUM (P) (mmol/L)   Date

## 2021-11-09 ENCOUNTER — OFFICE VISIT (OUTPATIENT)
Dept: INTERNAL MEDICINE CLINIC | Facility: CLINIC | Age: 71
End: 2021-11-09
Payer: COMMERCIAL

## 2021-11-09 VITALS
WEIGHT: 155.13 LBS | SYSTOLIC BLOOD PRESSURE: 112 MMHG | OXYGEN SATURATION: 98 % | DIASTOLIC BLOOD PRESSURE: 70 MMHG | BODY MASS INDEX: 24.93 KG/M2 | HEIGHT: 66 IN | HEART RATE: 55 BPM

## 2021-11-09 DIAGNOSIS — M17.11 PRIMARY OSTEOARTHRITIS OF RIGHT KNEE: ICD-10-CM

## 2021-11-09 DIAGNOSIS — R04.2 HEMOPTYSIS: ICD-10-CM

## 2021-11-09 DIAGNOSIS — Z85.3 HISTORY OF BREAST CANCER: ICD-10-CM

## 2021-11-09 DIAGNOSIS — Z12.31 VISIT FOR SCREENING MAMMOGRAM: ICD-10-CM

## 2021-11-09 DIAGNOSIS — I10 ESSENTIAL HYPERTENSION: ICD-10-CM

## 2021-11-09 DIAGNOSIS — Z00.00 MEDICARE ANNUAL WELLNESS VISIT, SUBSEQUENT: Primary | ICD-10-CM

## 2021-11-09 DIAGNOSIS — Z23 NEED FOR VACCINATION: ICD-10-CM

## 2021-11-09 DIAGNOSIS — M85.80 OSTEOPENIA, UNSPECIFIED LOCATION: ICD-10-CM

## 2021-11-09 PROCEDURE — G0008 ADMIN INFLUENZA VIRUS VAC: HCPCS | Performed by: INTERNAL MEDICINE

## 2021-11-09 PROCEDURE — 3008F BODY MASS INDEX DOCD: CPT | Performed by: INTERNAL MEDICINE

## 2021-11-09 PROCEDURE — 3078F DIAST BP <80 MM HG: CPT | Performed by: INTERNAL MEDICINE

## 2021-11-09 PROCEDURE — 99397 PER PM REEVAL EST PAT 65+ YR: CPT | Performed by: INTERNAL MEDICINE

## 2021-11-09 PROCEDURE — 96160 PT-FOCUSED HLTH RISK ASSMT: CPT | Performed by: INTERNAL MEDICINE

## 2021-11-09 PROCEDURE — 3074F SYST BP LT 130 MM HG: CPT | Performed by: INTERNAL MEDICINE

## 2021-11-09 PROCEDURE — 90662 IIV NO PRSV INCREASED AG IM: CPT | Performed by: INTERNAL MEDICINE

## 2021-11-09 PROCEDURE — G0439 PPPS, SUBSEQ VISIT: HCPCS | Performed by: INTERNAL MEDICINE

## 2021-11-30 ENCOUNTER — LAB ENCOUNTER (OUTPATIENT)
Dept: LAB | Age: 71
End: 2021-11-30
Attending: INTERNAL MEDICINE
Payer: MEDICARE

## 2021-11-30 ENCOUNTER — TELEPHONE (OUTPATIENT)
Dept: INTERNAL MEDICINE CLINIC | Facility: CLINIC | Age: 71
End: 2021-11-30

## 2021-11-30 DIAGNOSIS — N39.0 ACUTE UTI: Primary | ICD-10-CM

## 2021-11-30 DIAGNOSIS — N39.0 ACUTE UTI: ICD-10-CM

## 2021-11-30 PROCEDURE — 87186 SC STD MICRODIL/AGAR DIL: CPT

## 2021-11-30 PROCEDURE — 87086 URINE CULTURE/COLONY COUNT: CPT

## 2021-11-30 PROCEDURE — 81001 URINALYSIS AUTO W/SCOPE: CPT

## 2021-11-30 PROCEDURE — 87088 URINE BACTERIA CULTURE: CPT

## 2021-11-30 RX ORDER — NITROFURANTOIN 25; 75 MG/1; MG/1
100 CAPSULE ORAL 2 TIMES DAILY
Qty: 14 CAPSULE | Refills: 0 | Status: SHIPPED | OUTPATIENT
Start: 2021-11-30

## 2021-11-30 NOTE — TELEPHONE ENCOUNTER
UTI symptoms:  [x]Frequency  [x]Urgency  [x]Pain/burning  []Blood in urine  []Low back pain  []Flank pain  []Fevers/chills  []Odor  []Confusion    NOTES:  Symptom onset: 3 days ago. Denies fever, chills, hematuria, flank pain, malodorous urine.   No appts

## 2021-11-30 NOTE — TELEPHONE ENCOUNTER
To nursing, check UA and urine culture. Then start Macrobid 100 mg twice daily #14. Rx sent to her pharmacy. Cancel tomorrow's appointment. Thanks.     1. Acute UTI  - URINE CULTURE, ROUTINE; Future  - URINALYSIS, ROUTINE; Future

## 2021-11-30 NOTE — TELEPHONE ENCOUNTER
I spoke with patient and relayed Dr. Michael Moreira message. She verbalized understanding. She asks how will she know that this is not a kidney stone.  She asks what she will do if the urine test is normal. I did ask her if she would prefer to keep the appoint

## 2021-12-01 ENCOUNTER — TELEPHONE (OUTPATIENT)
Dept: INTERNAL MEDICINE CLINIC | Facility: CLINIC | Age: 71
End: 2021-12-01

## 2021-12-01 DIAGNOSIS — B96.20 E. COLI UTI: Primary | ICD-10-CM

## 2021-12-01 DIAGNOSIS — N39.0 E. COLI UTI: Primary | ICD-10-CM

## 2021-12-02 NOTE — TELEPHONE ENCOUNTER
To nursing, please tell patient urine culture confirms a urinary tract infection the Macrobid should clear. If symptoms do not resolve, she will need to be seen.   She is leaving for the winter for [place] tomorrow and would need to be seen in [place] at

## 2021-12-03 NOTE — TELEPHONE ENCOUNTER
Spoke to pt and advised on MD message below; pt verbalized understanding. Labs mailed to [facility] address:  [address]    Assisted pt in finding [address] [place] [address] near [place]. Pt will call when labs completed.  EMA fax written

## 2021-12-27 ENCOUNTER — TELEPHONE (OUTPATIENT)
Dept: INTERNAL MEDICINE CLINIC | Facility: CLINIC | Age: 71
End: 2021-12-27

## 2021-12-27 NOTE — TELEPHONE ENCOUNTER
To nursing, please tell patient I received results of her urinalysis and urine culture done 12/15/21 at [address] [place] [address] in [place]–results are negative (normal). The UTI has cleared. Thanks.

## 2021-12-28 NOTE — TELEPHONE ENCOUNTER
Spoke with patient and relayed Dr. Tapia Pi message re: lab results. Patient verbalized understanding.

## 2022-03-04 RX ORDER — TRIAMTERENE AND HYDROCHLOROTHIAZIDE 37.5; 25 MG/1; MG/1
1 TABLET ORAL DAILY
Qty: 90 TABLET | Refills: 3 | Status: SHIPPED | OUTPATIENT
Start: 2022-03-04

## 2022-05-10 ENCOUNTER — HOSPITAL ENCOUNTER (OUTPATIENT)
Dept: MAMMOGRAPHY | Facility: HOSPITAL | Age: 72
Discharge: HOME | End: 2022-05-10
Attending: INTERNAL MEDICINE
Payer: MEDICARE

## 2022-05-10 DIAGNOSIS — Z12.31 VISIT FOR SCREENING MAMMOGRAM: ICD-10-CM

## 2022-05-10 PROCEDURE — 77063 BREAST TOMOSYNTHESIS BI: CPT | Performed by: INTERNAL MEDICINE

## 2022-05-10 PROCEDURE — 77067 SCR MAMMO BI INCL CAD: CPT | Performed by: INTERNAL MEDICINE

## 2022-05-17 ENCOUNTER — OFFICE VISIT (OUTPATIENT)
Dept: INTERNAL MEDICINE CLINIC | Facility: CLINIC | Age: 72
End: 2022-05-17
Payer: COMMERCIAL

## 2022-05-17 VITALS
SYSTOLIC BLOOD PRESSURE: 120 MMHG | HEIGHT: 66 IN | WEIGHT: 158 LBS | HEART RATE: 56 BPM | DIASTOLIC BLOOD PRESSURE: 82 MMHG | BODY MASS INDEX: 25.39 KG/M2 | TEMPERATURE: 99 F

## 2022-05-17 DIAGNOSIS — L60.9 FINGERNAIL ABNORMALITIES: ICD-10-CM

## 2022-05-17 DIAGNOSIS — I10 ESSENTIAL HYPERTENSION: Primary | ICD-10-CM

## 2022-05-17 DIAGNOSIS — M17.11 PRIMARY OSTEOARTHRITIS OF RIGHT KNEE: ICD-10-CM

## 2022-05-17 DIAGNOSIS — Z85.3 HISTORY OF BREAST CANCER: ICD-10-CM

## 2022-05-17 PROCEDURE — 3074F SYST BP LT 130 MM HG: CPT | Performed by: INTERNAL MEDICINE

## 2022-05-17 PROCEDURE — 99214 OFFICE O/P EST MOD 30 MIN: CPT | Performed by: INTERNAL MEDICINE

## 2022-05-17 PROCEDURE — 3079F DIAST BP 80-89 MM HG: CPT | Performed by: INTERNAL MEDICINE

## 2022-05-17 PROCEDURE — 3008F BODY MASS INDEX DOCD: CPT | Performed by: INTERNAL MEDICINE

## 2022-06-07 ENCOUNTER — APPOINTMENT (OUTPATIENT)
Dept: URBAN - METROPOLITAN AREA CLINIC 244 | Age: 72
Setting detail: DERMATOLOGY
End: 2022-06-11

## 2022-06-07 DIAGNOSIS — L81.4 OTHER MELANIN HYPERPIGMENTATION: ICD-10-CM

## 2022-06-07 DIAGNOSIS — D22 MELANOCYTIC NEVI: ICD-10-CM

## 2022-06-07 DIAGNOSIS — L82.1 OTHER SEBORRHEIC KERATOSIS: ICD-10-CM

## 2022-06-07 DIAGNOSIS — B35.1 TINEA UNGUIUM: ICD-10-CM

## 2022-06-07 PROBLEM — D48.5 NEOPLASM OF UNCERTAIN BEHAVIOR OF SKIN: Status: ACTIVE | Noted: 2022-06-07

## 2022-06-07 PROBLEM — D22.5 MELANOCYTIC NEVI OF TRUNK: Status: ACTIVE | Noted: 2022-06-07

## 2022-06-07 PROCEDURE — OTHER PRESCRIPTION MEDICATION MANAGEMENT: OTHER

## 2022-06-07 PROCEDURE — 99204 OFFICE O/P NEW MOD 45 MIN: CPT

## 2022-06-07 PROCEDURE — OTHER DEFER: OTHER

## 2022-06-07 PROCEDURE — OTHER COUNSELING: OTHER

## 2022-06-07 PROCEDURE — OTHER ADDITIONAL NOTES: OTHER

## 2022-06-07 ASSESSMENT — LOCATION SIMPLE DESCRIPTION DERM
LOCATION SIMPLE: LEFT 5TH TOE
LOCATION SIMPLE: LEFT UPPER BACK
LOCATION SIMPLE: RIGHT SHOULDER
LOCATION SIMPLE: LEFT 5TH TOE
LOCATION SIMPLE: CHEST
LOCATION SIMPLE: RIGHT UPPER BACK

## 2022-06-07 ASSESSMENT — LOCATION DETAILED DESCRIPTION DERM
LOCATION DETAILED: RIGHT SUPERIOR MEDIAL UPPER BACK
LOCATION DETAILED: UPPER STERNUM
LOCATION DETAILED: LEFT 5TH TOENAIL
LOCATION DETAILED: LEFT DISTAL PLANTAR 5TH TOE
LOCATION DETAILED: LEFT MEDIAL UPPER BACK
LOCATION DETAILED: RIGHT POSTERIOR SHOULDER

## 2022-06-07 ASSESSMENT — LOCATION ZONE DERM
LOCATION ZONE: ARM
LOCATION ZONE: TOENAIL
LOCATION ZONE: TOE
LOCATION ZONE: TRUNK

## 2022-06-07 NOTE — PROCEDURE: COUNSELING
Detail Level: Simple
Sunscreen Recommendations: Recommend at least SPF 30 use daily. Reapply every 2 hours or more frequently if sweating/exercising or water exposure. Recommend broad spectrum sunscreen. Zinc oxide and titanium dioxide formulations are preferred over \"chemical\" based sunscreens. Wear a wide brimmed hat and sun protective clothing.
Detail Level: Generalized

## 2022-06-07 NOTE — PROCEDURE: PRESCRIPTION MEDICATION MANAGEMENT
Detail Level: Zone
Plan: discussed tx options including topical ciclopirox - declined
Render In Strict Bullet Format?: No

## 2022-06-10 ENCOUNTER — TELEPHONE (OUTPATIENT)
Dept: INTERNAL MEDICINE CLINIC | Facility: CLINIC | Age: 72
End: 2022-06-10

## 2022-06-10 ENCOUNTER — OFFICE VISIT (OUTPATIENT)
Dept: INTERNAL MEDICINE CLINIC | Facility: CLINIC | Age: 72
End: 2022-06-10
Payer: COMMERCIAL

## 2022-06-10 VITALS
HEART RATE: 57 BPM | SYSTOLIC BLOOD PRESSURE: 116 MMHG | DIASTOLIC BLOOD PRESSURE: 68 MMHG | BODY MASS INDEX: 26 KG/M2 | HEIGHT: 66 IN | TEMPERATURE: 99 F | OXYGEN SATURATION: 97 %

## 2022-06-10 DIAGNOSIS — H00.014 HORDEOLUM EXTERNUM OF LEFT UPPER EYELID: Primary | ICD-10-CM

## 2022-06-10 PROCEDURE — 1126F AMNT PAIN NOTED NONE PRSNT: CPT | Performed by: INTERNAL MEDICINE

## 2022-06-10 PROCEDURE — 3008F BODY MASS INDEX DOCD: CPT | Performed by: INTERNAL MEDICINE

## 2022-06-10 PROCEDURE — 99212 OFFICE O/P EST SF 10 MIN: CPT | Performed by: INTERNAL MEDICINE

## 2022-06-10 PROCEDURE — 3078F DIAST BP <80 MM HG: CPT | Performed by: INTERNAL MEDICINE

## 2022-06-10 PROCEDURE — 3074F SYST BP LT 130 MM HG: CPT | Performed by: INTERNAL MEDICINE

## 2022-06-10 RX ORDER — ERYTHROMYCIN 5 MG/G
1 OINTMENT OPHTHALMIC 3 TIMES DAILY
Qty: 1 EACH | Refills: 0 | Status: SHIPPED | OUTPATIENT
Start: 2022-06-10

## 2022-06-10 RX ORDER — MULTIVITAMIN
1 TABLET ORAL DAILY
COMMUNITY

## 2022-06-10 NOTE — TELEPHONE ENCOUNTER
Patient calling for appointment today with Dr. Delfina Simons. Stye top left eyelid. Swollen, sore and redness.     Best call back number [phone]

## 2022-06-13 ENCOUNTER — APPOINTMENT (OUTPATIENT)
Dept: URBAN - METROPOLITAN AREA CLINIC 244 | Age: 72
Setting detail: DERMATOLOGY
End: 2022-06-13

## 2022-06-13 DIAGNOSIS — D485 NEOPLASM OF UNCERTAIN BEHAVIOR OF SKIN: ICD-10-CM

## 2022-06-13 PROBLEM — D48.5 NEOPLASM OF UNCERTAIN BEHAVIOR OF SKIN: Status: ACTIVE | Noted: 2022-06-13

## 2022-06-13 PROCEDURE — OTHER BIOPSY BY SHAVE METHOD: OTHER

## 2022-06-13 PROCEDURE — 11102 TANGNTL BX SKIN SINGLE LES: CPT

## 2022-06-13 ASSESSMENT — LOCATION DETAILED DESCRIPTION DERM: LOCATION DETAILED: RIGHT POSTERIOR SHOULDER

## 2022-06-13 ASSESSMENT — LOCATION SIMPLE DESCRIPTION DERM: LOCATION SIMPLE: RIGHT SHOULDER

## 2022-06-13 ASSESSMENT — LOCATION ZONE DERM: LOCATION ZONE: ARM

## 2022-06-13 NOTE — PROCEDURE: BIOPSY BY SHAVE METHOD
Additional Anesthesia Volume In Cc (Will Not Render If 0): 0
Render Path Notes In Note?: No
Silver Nitrate Text: The wound bed was treated with silver nitrate after the biopsy was performed.
Biopsy Type: H and E
Curettage Text: The wound bed was treated with curettage after the biopsy was performed.
Billing Type: Third-Party Bill
Type Of Destruction Used: Curettage
Anesthesia Volume In Cc (Will Not Render If 0): 0.5
Was A Bandage Applied: Yes
Hemostasis: Drysol
Biopsy Method: Dermablade
Detail Level: Detailed
Electrodesiccation Text: The wound bed was treated with electrodesiccation after the biopsy was performed.
Depth Of Biopsy: dermis
Cryotherapy Text: The wound bed was treated with cryotherapy after the biopsy was performed.
Post-Care Instructions: I reviewed with the patient in detail post-care instructions. Patient is to keep the biopsy site dry overnight, and then apply Petrolatum twice daily until healed, or after a night or two leave area open and dry overnight and a scab will form which will fall off on its own in a few weeks.
Anesthesia Type: 0.5% lidocaine with 1:200,000 epinephrine and a 1:10 solution of 8.4% sodium bicarbonate
Notification Instructions: Patient will be notified of biopsy results. However, patient instructed to call the office if not contacted within 2 weeks.
Consent: Written consent was obtained and risks were reviewed including but not limited to scarring, infection, bleeding, scabbing, incomplete removal, nerve damage and allergy to anesthesia.
Information: Selecting Yes will display possible errors in your note based on the variables you have selected. This validation is only offered as a suggestion for you. PLEASE NOTE THAT THE VALIDATION TEXT WILL BE REMOVED WHEN YOU FINALIZE YOUR NOTE. IF YOU WANT TO FAX A PRELIMINARY NOTE YOU WILL NEED TO TOGGLE THIS TO 'NO' IF YOU DO NOT WANT IT IN YOUR FAXED NOTE.
Wound Care: Petrolatum
Dressing: bandage
Electrodesiccation And Curettage Text: The wound bed was treated with electrodesiccation and curettage after the biopsy was performed.

## 2022-11-08 ENCOUNTER — LAB ENCOUNTER (OUTPATIENT)
Dept: LAB | Facility: REFERENCE LAB | Age: 72
End: 2022-11-08
Attending: INTERNAL MEDICINE
Payer: MEDICARE

## 2022-11-08 DIAGNOSIS — I10 ESSENTIAL HYPERTENSION: ICD-10-CM

## 2022-11-08 LAB
ALBUMIN SERPL-MCNC: 3.8 G/DL (ref 3.4–5)
ALBUMIN/GLOB SERPL: 1.1 {RATIO} (ref 1–2)
ALP LIVER SERPL-CCNC: 81 U/L
ALT SERPL-CCNC: 22 U/L
ANION GAP SERPL CALC-SCNC: 6 MMOL/L (ref 0–18)
AST SERPL-CCNC: 19 U/L (ref 15–37)
BASOPHILS # BLD AUTO: 0.01 X10(3) UL (ref 0–0.2)
BASOPHILS NFR BLD AUTO: 0.3 %
BILIRUB SERPL-MCNC: 0.6 MG/DL (ref 0.1–2)
BILIRUB UR QL: NEGATIVE
BUN BLD-MCNC: 18 MG/DL (ref 7–18)
BUN/CREAT SERPL: 24 (ref 10–20)
CALCIUM BLD-MCNC: 9.2 MG/DL (ref 8.5–10.1)
CHLORIDE SERPL-SCNC: 105 MMOL/L (ref 98–112)
CHOLEST SERPL-MCNC: 168 MG/DL (ref ?–200)
CLARITY UR: CLEAR
CO2 SERPL-SCNC: 31 MMOL/L (ref 21–32)
COLOR UR: YELLOW
CREAT BLD-MCNC: 0.75 MG/DL
DEPRECATED RDW RBC AUTO: 46.2 FL (ref 35.1–46.3)
EOSINOPHIL # BLD AUTO: 0.19 X10(3) UL (ref 0–0.7)
EOSINOPHIL NFR BLD AUTO: 4.9 %
ERYTHROCYTE [DISTWIDTH] IN BLOOD BY AUTOMATED COUNT: 12.5 % (ref 11–15)
FASTING PATIENT LIPID ANSWER: YES
FASTING STATUS PATIENT QL REPORTED: YES
GFR SERPLBLD BASED ON 1.73 SQ M-ARVRAT: 86 ML/MIN/1.73M2 (ref 60–?)
GLOBULIN PLAS-MCNC: 3.4 G/DL (ref 2.8–4.4)
GLUCOSE BLD-MCNC: 86 MG/DL (ref 70–99)
GLUCOSE UR-MCNC: NEGATIVE MG/DL
HCT VFR BLD AUTO: 43.9 %
HDLC SERPL-MCNC: 58 MG/DL (ref 40–59)
HGB BLD-MCNC: 13.8 G/DL
HGB UR QL STRIP.AUTO: NEGATIVE
IMM GRANULOCYTES # BLD AUTO: 0.01 X10(3) UL (ref 0–1)
IMM GRANULOCYTES NFR BLD: 0.3 %
KETONES UR-MCNC: NEGATIVE MG/DL
LDLC SERPL CALC-MCNC: 96 MG/DL (ref ?–100)
LYMPHOCYTES # BLD AUTO: 1.3 X10(3) UL (ref 1–4)
LYMPHOCYTES NFR BLD AUTO: 33.8 %
MCH RBC QN AUTO: 31.2 PG (ref 26–34)
MCHC RBC AUTO-ENTMCNC: 31.4 G/DL (ref 31–37)
MCV RBC AUTO: 99.1 FL
MONOCYTES # BLD AUTO: 0.35 X10(3) UL (ref 0.1–1)
MONOCYTES NFR BLD AUTO: 9.1 %
NEUTROPHILS # BLD AUTO: 1.99 X10 (3) UL (ref 1.5–7.7)
NEUTROPHILS # BLD AUTO: 1.99 X10(3) UL (ref 1.5–7.7)
NEUTROPHILS NFR BLD AUTO: 51.6 %
NITRITE UR QL STRIP.AUTO: NEGATIVE
NONHDLC SERPL-MCNC: 110 MG/DL (ref ?–130)
OSMOLALITY SERPL CALC.SUM OF ELEC: 295 MOSM/KG (ref 275–295)
PH UR: 6 [PH] (ref 5–8)
PLATELET # BLD AUTO: 246 10(3)UL (ref 150–450)
POTASSIUM SERPL-SCNC: 3.7 MMOL/L (ref 3.5–5.1)
PROT SERPL-MCNC: 7.2 G/DL (ref 6.4–8.2)
PROT UR-MCNC: NEGATIVE MG/DL
RBC # BLD AUTO: 4.43 X10(6)UL
SODIUM SERPL-SCNC: 142 MMOL/L (ref 136–145)
SP GR UR STRIP: 1.01 (ref 1–1.03)
TRIGL SERPL-MCNC: 74 MG/DL (ref 30–149)
TSI SER-ACNC: 2.06 MIU/ML (ref 0.36–3.74)
UROBILINOGEN UR STRIP-ACNC: <2
VIT C UR-MCNC: NEGATIVE MG/DL
VLDLC SERPL CALC-MCNC: 12 MG/DL (ref 0–30)
WBC # BLD AUTO: 3.9 X10(3) UL (ref 4–11)

## 2022-11-08 PROCEDURE — 81015 MICROSCOPIC EXAM OF URINE: CPT | Performed by: INTERNAL MEDICINE

## 2022-11-08 PROCEDURE — 84443 ASSAY THYROID STIM HORMONE: CPT

## 2022-11-08 PROCEDURE — 81001 URINALYSIS AUTO W/SCOPE: CPT | Performed by: INTERNAL MEDICINE

## 2022-11-08 PROCEDURE — 80061 LIPID PANEL: CPT

## 2022-11-08 PROCEDURE — 80053 COMPREHEN METABOLIC PANEL: CPT

## 2022-11-08 PROCEDURE — 87086 URINE CULTURE/COLONY COUNT: CPT | Performed by: INTERNAL MEDICINE

## 2022-11-08 PROCEDURE — 36415 COLL VENOUS BLD VENIPUNCTURE: CPT

## 2022-11-08 PROCEDURE — 85025 COMPLETE CBC W/AUTO DIFF WBC: CPT

## 2022-11-22 ENCOUNTER — OFFICE VISIT (OUTPATIENT)
Dept: INTERNAL MEDICINE CLINIC | Facility: CLINIC | Age: 72
End: 2022-11-22
Payer: COMMERCIAL

## 2022-11-22 VITALS
BODY MASS INDEX: 25.39 KG/M2 | TEMPERATURE: 99 F | WEIGHT: 158 LBS | DIASTOLIC BLOOD PRESSURE: 70 MMHG | HEIGHT: 66 IN | SYSTOLIC BLOOD PRESSURE: 120 MMHG | HEART RATE: 56 BPM

## 2022-11-22 DIAGNOSIS — Z87.898 HISTORY OF HEMOPTYSIS: ICD-10-CM

## 2022-11-22 DIAGNOSIS — M85.80 OSTEOPENIA, UNSPECIFIED LOCATION: ICD-10-CM

## 2022-11-22 DIAGNOSIS — I10 ESSENTIAL HYPERTENSION: ICD-10-CM

## 2022-11-22 DIAGNOSIS — Z85.3 HISTORY OF BREAST CANCER: ICD-10-CM

## 2022-11-22 DIAGNOSIS — Z00.00 MEDICARE ANNUAL WELLNESS VISIT, SUBSEQUENT: Primary | ICD-10-CM

## 2022-11-22 DIAGNOSIS — M17.11 PRIMARY OSTEOARTHRITIS OF RIGHT KNEE: ICD-10-CM

## 2023-04-13 RX ORDER — TRIAMTERENE AND HYDROCHLOROTHIAZIDE 37.5; 25 MG/1; MG/1
1 TABLET ORAL DAILY
Qty: 90 TABLET | Refills: 3 | Status: SHIPPED | OUTPATIENT
Start: 2023-04-13

## 2023-06-12 ENCOUNTER — OFFICE VISIT (OUTPATIENT)
Dept: INTERNAL MEDICINE CLINIC | Facility: CLINIC | Age: 73
End: 2023-06-12

## 2023-06-12 DIAGNOSIS — I10 ESSENTIAL HYPERTENSION: ICD-10-CM

## 2023-06-12 DIAGNOSIS — Z12.31 VISIT FOR SCREENING MAMMOGRAM: ICD-10-CM

## 2023-06-12 DIAGNOSIS — Z00.00 PHYSICAL EXAM, ANNUAL: Primary | ICD-10-CM

## 2023-06-12 DIAGNOSIS — G47.33 OSA (OBSTRUCTIVE SLEEP APNEA): ICD-10-CM

## 2023-06-12 DIAGNOSIS — Z85.3 HISTORY OF BREAST CANCER: ICD-10-CM

## 2023-06-12 DIAGNOSIS — M85.80 OSTEOPENIA, UNSPECIFIED LOCATION: ICD-10-CM

## 2023-06-12 DIAGNOSIS — R32 URINARY INCONTINENCE, UNSPECIFIED TYPE: ICD-10-CM

## 2023-06-12 DIAGNOSIS — M17.11 PRIMARY OSTEOARTHRITIS OF RIGHT KNEE: ICD-10-CM

## 2023-06-12 DIAGNOSIS — E28.39 MENOPAUSE OVARIAN FAILURE: ICD-10-CM

## 2023-06-12 DIAGNOSIS — R51.9 NOCTURNAL HEADACHES: ICD-10-CM

## 2023-06-12 DIAGNOSIS — Z87.898 HISTORY OF HEMOPTYSIS: ICD-10-CM

## 2023-06-13 VITALS
SYSTOLIC BLOOD PRESSURE: 126 MMHG | RESPIRATION RATE: 12 BRPM | HEART RATE: 56 BPM | TEMPERATURE: 98 F | DIASTOLIC BLOOD PRESSURE: 70 MMHG

## 2023-08-16 ENCOUNTER — OFFICE VISIT (OUTPATIENT)
Dept: SLEEP CENTER | Age: 73
End: 2023-08-16
Attending: INTERNAL MEDICINE
Payer: MEDICARE

## 2023-08-16 DIAGNOSIS — G47.33 OSA (OBSTRUCTIVE SLEEP APNEA): ICD-10-CM

## 2023-08-16 PROCEDURE — 95806 SLEEP STUDY UNATT&RESP EFFT: CPT

## 2023-08-18 ENCOUNTER — HOSPITAL ENCOUNTER (OUTPATIENT)
Dept: MAMMOGRAPHY | Facility: HOSPITAL | Age: 73
Discharge: HOME | End: 2023-08-18
Attending: INTERNAL MEDICINE
Payer: MEDICARE

## 2023-08-18 ENCOUNTER — APPOINTMENT (OUTPATIENT)
Dept: BONE DENSITY | Facility: HOSPITAL | Age: 73
End: 2023-08-18
Attending: INTERNAL MEDICINE
Payer: MEDICARE

## 2023-08-18 DIAGNOSIS — Z12.31 VISIT FOR SCREENING MAMMOGRAM: ICD-10-CM

## 2023-08-18 PROCEDURE — 77063 BREAST TOMOSYNTHESIS BI: CPT | Performed by: INTERNAL MEDICINE

## 2023-08-18 PROCEDURE — 77067 SCR MAMMO BI INCL CAD: CPT | Performed by: INTERNAL MEDICINE

## 2023-08-20 NOTE — PROCEDURES
[facility]  Accredited by the [employer] (AASM)    PATIENT'S NAME: Codie Aceey   ATTENDING PHYSICIAN: John Rivers. Chrissy Goldstein MD   REFERRING PHYSICIAN: John Rivers. Chrissy Goldstein MD   PATIENT ACCOUNT #: [de-identified] LOCATION: [address] [place] [address] RECORD #: [id number] YOB: 1950   DATE OF STUDY: 08/16/2023       SLEEP STUDY REPORT    STUDY TYPE:  Home sleep test.    INDICATION:  Suspected obstructive sleep apnea (ICD-10 code G47.33) in patient with snoring, apnea, hypersomnia, hypertension, morning headache, unrefreshing sleep, family history of sleep apnea, an Crapo Sleepiness Scale score of 4/24, and a body mass index of 26.0. RESULTS:  The patient underwent home sleep test with measurement of her nasal air flow and nasal air pressure, snoring, chest and abdominal wall motion, oximetry, and body position. I have reviewed the entirety of the raw data of this study. During this study, the total recording time was 397 minutes. The lights-out clock time was 11:44 p.m., lights-on clock time was 6:21 a.m. The apnea plus hypopnea index is 4.4 events per hour and this jerrod to 5.6 events per hour in the supine position. The average oxygen saturation is 97%. The lowest oxygen saturation is 94%, and the patient spent 0% of the testing with saturations less than 88%. The average heart rate was 50 beats per minute and the patient spent approximately 75% of the testing in the supine position. INTERPRETATION:  The data generated from this study shows no evidence of significant sleep-disordered breathing. Snoring was appreciated. RECOMMENDATIONS:    1. Clinical correlation is required. 2.   Consider extending sleep by 1 to 2 hours to assess for impact on daytime sleepiness. 3.   Avoid alcohol and sedating drug. 4.   Patient should not drive if at all sleepy.     Please do not hesitate to contact me if there is any question whatsoever regarding interpretation of this study. Dictated By Sofia Rosenberg MD  d: 08/20/2023 17:35:53  t: 08/20/2023 18:32:08  [facility] [id number]  RR/    cc: Edwige Hallman.  Jennifer Gupta MD

## 2023-08-21 ENCOUNTER — TELEPHONE (OUTPATIENT)
Dept: INTERNAL MEDICINE CLINIC | Facility: CLINIC | Age: 73
End: 2023-08-21

## 2023-09-05 ENCOUNTER — HOSPITAL ENCOUNTER (OUTPATIENT)
Dept: BONE DENSITY | Age: 73
Discharge: HOME | End: 2023-09-05
Attending: INTERNAL MEDICINE
Payer: MEDICARE

## 2023-09-05 DIAGNOSIS — E28.39 MENOPAUSE OVARIAN FAILURE: ICD-10-CM

## 2023-09-05 PROCEDURE — 77080 DXA BONE DENSITY AXIAL: CPT | Performed by: INTERNAL MEDICINE

## 2023-09-22 ENCOUNTER — OFFICE VISIT (OUTPATIENT)
Dept: DERMATOLOGY CLINIC | Facility: CLINIC | Age: 73
End: 2023-09-22

## 2023-09-22 DIAGNOSIS — L82.1 SK (SEBORRHEIC KERATOSIS): Primary | ICD-10-CM

## 2023-09-22 DIAGNOSIS — D23.30 BENIGN NEOPLASM OF SKIN OF FACE: ICD-10-CM

## 2023-09-22 DIAGNOSIS — L81.8 IDIOPATHIC GUTTATE HYPOMELANOSIS: ICD-10-CM

## 2023-09-22 DIAGNOSIS — L81.4 LENTIGO: ICD-10-CM

## 2023-09-22 DIAGNOSIS — D23.5 BENIGN NEOPLASM OF SKIN OF TRUNK: ICD-10-CM

## 2023-09-22 DIAGNOSIS — L65.9 HAIR LOSS: ICD-10-CM

## 2023-09-22 DIAGNOSIS — D23.4 BENIGN NEOPLASM OF SCALP AND SKIN OF NECK: ICD-10-CM

## 2023-09-22 DIAGNOSIS — L60.9 LONGITUDINAL NAIL RIDGE: ICD-10-CM

## 2023-09-22 DIAGNOSIS — D23.60 BENIGN NEOPLASM OF SKIN OF UPPER LIMB, INCLUDING SHOULDER, UNSPECIFIED LATERALITY: ICD-10-CM

## 2023-09-22 DIAGNOSIS — D23.70 BENIGN NEOPLASM OF SKIN OF LOWER LIMB, INCLUDING HIP, UNSPECIFIED LATERALITY: ICD-10-CM

## 2023-09-22 DIAGNOSIS — D22.9 MULTIPLE NEVI: ICD-10-CM

## 2023-09-22 PROCEDURE — 1160F RVW MEDS BY RX/DR IN RCRD: CPT | Performed by: DERMATOLOGY

## 2023-09-22 PROCEDURE — 1159F MED LIST DOCD IN RCRD: CPT | Performed by: DERMATOLOGY

## 2023-09-22 PROCEDURE — 99204 OFFICE O/P NEW MOD 45 MIN: CPT | Performed by: DERMATOLOGY

## 2023-09-22 PROCEDURE — 1126F AMNT PAIN NOTED NONE PRSNT: CPT | Performed by: DERMATOLOGY

## 2023-10-02 NOTE — PROGRESS NOTES
Meaghan Maciel is a 81year old female. HPI:     CC:  Patient presents with:  Full Skin Exam: LOV 7/31/15. Pt present for full body check. Denies hx of skin ca. Pt denies areas of concern. Allergies:  Adapalene    HISTORY:    Past Medical History:   Diagnosis Date    Breast cancer [facility])     Chemotherapy, left lumpectomy--sees Dr Martinez Shellenoc    Diverticulosis     cscopy  Dr. Cody Ramírez & int hem    Essential hypertension     Exposure to medical diagnostic radiation     Frequent headaches     MRI brain     Hemoptysis     Dr. Miguel Dior in -flexible layngoscopy was neg. CXR -neg. Hemoptysis  and     flex laryngoscopy 18 Dr. Kevon Wen irritation in the nasapharynx. High blood pressure     Hypertension, essential     Incontinence     bladder    Internal hemorrhoids     Colonoscopy    Microscopic hematuria     Osteoarthritis     Personal history of antineoplastic chemotherapy     Right knee DJD 20-xray R knee--narrowing of medial joint space and small joint effusion. Saw Dr Mckinley Lerma. Visual impairment     Vitamin D deficiency       Past Surgical History:   Procedure Laterality Date    CHEMOTHERAPY      COLONOSCOPY  2011    COLONOSCOPY  2019    D & C  1998    Hysteroscopy/D&C    DILATION/CURETTAGE,DIAGNOSTIC      ELBOW SURGERY Left 2020    ORIF L olecranon fracture 20 -Dr Mckinley Lerma.      ELECTROCARDIOGRAM, COMPLETE  04-    Scanned to media tab 04-    HYSTEROSCOPY      Hysteroscopy/D&C    LUMPECTOMY LEFT      RADIATION LEFT        Family History   Problem Relation Age of Onset    Dementia Brother 68    Alcohol and Other Disorders Associated Father         Alcoholism    Cancer Father         Cancer-throat-- age 64 (cause of death)    Depression Father     Heart Disorder Mother          age 61 (cause of death)    Cancer Brother 64        multiple myeloma-- age 68    Other (Other) Brother         Sleep apnea    Breast Cancer Self         46      Social History     Socioeconomic History    Marital status:    Tobacco Use    Smoking status: Never    Smokeless tobacco: Never   Vaping Use    Vaping Use: Never used   Substance and Sexual Activity    Alcohol use: Not Currently     Comment: wine, rarely     Drug use: No   Other Topics Concern    Caffeine Concern Yes     Comment: coffee, 2 cups daily (No per NG ECD)    Reaction to local anesthetic No    Pt has a pacemaker No    Pt has a defibrillator No        Current Outpatient Medications   Medication Sig Dispense Refill    Triamterene-HCTZ 37.5-25 MG Oral Tab Take 1 tablet by mouth daily. 90 tablet 3    Multiple Vitamin (MULTI-VITAMIN DAILY) Oral Tab Take 1 tablet by mouth daily. Omega-3 Fatty Acids (FISH OIL OR) Take 1 capsule by mouth daily. Calcium Carbonate-Vitamin D (CALCIUM + D OR) Take 1 tablet by mouth daily. Allergies:     Adapalene               OTHER (SEE COMMENTS)    Comment:\"Eyes blew up\" after adapalene cream from derm in             2022    Past Medical History:   Diagnosis Date    Breast cancer [facility]) 1997    Chemotherapy, left lumpectomy--sees Dr Martinez Shellenoc    Diverticulosis 2011    cscopy 12/11 Dr. Cody Ramírez & int hem    Essential hypertension     Exposure to medical diagnostic radiation     Frequent headaches 2002    MRI brain 2002    Hemoptysis 2012    Dr. Miguel Dior in 4/12-flexible layngoscopy was neg. CXR 4/12-neg. Hemoptysis 2012 and 2018    flex laryngoscopy 11/1/18 Dr. Kevon Wen irritation in the nasapharynx. High blood pressure     Hypertension, essential 2006    Incontinence     bladder    Internal hemorrhoids 2003    Colonoscopy    Microscopic hematuria 2009    Osteoarthritis     Personal history of antineoplastic chemotherapy     Right knee DJD 2017 12/19/17-xray R knee--narrowing of medial joint space and small joint effusion. Saw Dr Mckinley Lerma.     Visual impairment     Vitamin D deficiency 2013     Past Surgical History:   Procedure Laterality Date    CHEMOTHERAPY  1997    COLONOSCOPY  12/2011    COLONOSCOPY  03/2019    D & C  1998    Hysteroscopy/D&C    DILATION/CURETTAGE,DIAGNOSTIC      ELBOW SURGERY Left 11/05/2020    ORIF L olecranon fracture 11/5/20 -Dr Yesenia Potts.      ELECTROCARDIOGRAM, COMPLETE  04-    Scanned to media tab 04-    HYSTEROSCOPY  1998    Hysteroscopy/D&C    LUMPECTOMY LEFT  1997    RADIATION LEFT  1997     Social History    Socioeconomic History      Marital status:       Spouse name: Not on file      Number of children: Not on file      Years of education: Not on file      Highest education level: Not on file    Occupational History      Not on file    Tobacco Use      Smoking status: Never      Smokeless tobacco: Never    Vaping Use      Vaping Use: Never used    Substance and Sexual Activity      Alcohol use: Not Currently        Comment: wine, rarely       Drug use: No      Sexual activity: Not on file    Other Topics      Concerns:         Service: Not Asked        Blood Transfusions: Not Asked        Caffeine Concern: Yes          coffee, 2 cups daily (No per NG ECD)        Occupational Exposure: Not Asked        Hobby Hazards: Not Asked        Sleep Concern: Not Asked        Stress Concern: Not Asked        Weight Concern: Not Asked        Special Diet: Not Asked        Back Care: Not Asked        Exercise: Not Asked        Bike Helmet: Not Asked        Seat Belt: Not Asked        Self-Exams: Not Asked        Grew up on a farm: Not Asked        History of tanning: Not Asked        Outdoor occupation: Not Asked        Breast feeding: Not Asked        Reaction to local anesthetic: No        Pt has a pacemaker: No        Pt has a defibrillator: No    Social History Narrative      Not on file    Social Determinants of Health  Financial Resource Strain: Not on file  Food Insecurity: Not on file  Transportation Needs: Not on file  Physical Activity: Not on file  Stress: Not on file  Social Connections: Not on file  Housing Stability: Not on file  Family History   Problem Relation Age of Onset    Dementia Brother 68    Alcohol and Other Disorders Associated Father         Alcoholism    Cancer Father         Cancer-throat-- age 64 (cause of death)    Depression Father     Heart Disorder Mother          age 61 (cause of death)    Cancer Brother 64        multiple myeloma-- age 68    Other (Other) Brother         Sleep apnea    Breast Cancer Self         46       There were no vitals filed for this visit. HPI:  Patient presents with:  Full Skin Exam: LOV 7/31/15. Pt present for full body check. Denies hx of skin ca. Pt denies areas of concern. Patient for full skin exam.  Spends most of the year in [place] lots of sun exposure does use sunscreen. Also complains of hair loss nail changes no particular skin lesions of concern. No personal  or family history of skin cancer      Patient presents with concerns above. Patient has been in their usual state of health. Past notes/ records and appropriate/relevant lab results including pathology and past body maps reviewed. Including outside notes/ PCP notes as appropriate. Updated and new information noted in current visit. ROS:  Denies other relevant systemic complaints. History, medications, allergies reviewed as noted. Physical Examination:     Well-developed well-nourished patient alert oriented in no acute distress. Exam performed, including scalp, head, neck, face,nails, hair, external eyes, including conjunctival mucosa, eyelids, lips external ears , arms, digits,palms. Multiple light to medium brown, well marginated, uniformly pigmented, macules and papules 6 mm and less scattered on exam. pigmented lesions examined with dermoscopy benign-appearing patterns. Waxy tannish keratotic papules scattered, cherry-red vascular papules scattered.     See map today's date for lesions noted . See assessment and plan below for specific lesions. Otherwise remarkable for lesions as noted on map. See A/P  below for additional information:    Assessment / plan:    No orders of the defined types were placed in this encounter. Meds & Refills for this Visit:  Requested Prescriptions      No prescriptions requested or ordered in this encounter         Sk (seborrheic keratosis)  (primary encounter diagnosis)  Lentigo  Idiopathic guttate hypomelanosis  Multiple nevi  Benign neoplasm of scalp and skin of neck  Benign neoplasm of skin of face  Benign neoplasm of skin of upper limb, including shoulder, unspecified laterality  Benign neoplasm of skin of trunk  Benign neoplasm of skin of lower limb, including hip, unspecified laterality  Hair loss  Longitudinal nail ridge      Patient scattered nevi lentigines keratoses reassurance. Areas of guttate hypomelanosis over arms legs reassurance. Thumbnails with longitudinal ridging    Pathophysiology discussed. Hand, nailcare routine discussed. Biotin supplementation 1500 mcg at minimum daily for 3-4 months to assess response. Emollients with alphahydroxy acid. Use of clear nail polish removed in frequently to help splint nails discussed. No evidence of fungus. Recurrent nature reviewed. TSH if appropriate repeat in 3-4 months.     Patient complains of hair loss   Patient with diffuse decreased density more prominent over the anterior scalp consistent with pattern, age-related loss no inflammatory lesions, no evidence of scarring  does have a history of breast cancer discussed biotinEmersonaine consider p.o. if no improvement adequate protein    Chemistry CBC TSH unremarkable normal glucose no recent vitamin D or ferritin    Overall hair regimen discussed including B. vitamin supplementation, biotin to 10 mg daily-stop 3 days before any blood tests-, vitamin D3 2000 units daily, iron as appropriate, adequate protein intake (40 to 80 g daily), use of volumizing shampoos and antidandruff shampoos as appropriate. Use of minoxidil 5% foam twice daily as tolerated if patient desires. The fact this may take 2-4 months to see any significant change discussed. The fact regimen better and maintaining hair rather than regrowth reviewed    No other susupicious lesions on todays  exam.    Labs reviewed    Please refer to map for specific lesions. See additional diagnoses. Pros cons of various therapies, risks benefits discussed. Pathophysiology discussed with patient. Therapeutic options reviewed. See  Medications in grid. Instructions reviewed at length. Benign nevi, seborrheic  keratoses, cherry angiomas:  Reassurance regarding other benign skin lesions. Signs and symptoms of skin cancer, ABCDE's of melanoma discussed with patient. Sunscreen use, sun protection, self exams reviewed. Followup as noted RTC ---routine checkup    6 mos -one year or p.r.n. Encounter Times   Including precharting, reviewing chart, prior notes obtaining history: 10 minutes, medical exam :10 minutes, notes on body map, plan, counseling 10minutes My total time spent caring for the patient on the day of the encounter: 30 minutes     The patient indicates understanding of these issues and agrees to the plan. The patient is asked to return as noted in follow-up/ above. This note was generated using Dragon voice recognition software. Please contact me regarding any confusion resulting from errors in recognition. .  Note to patient and family: The [facility] makes medical notes like these available to patients. However, be advised this is a medical document. It is intended as [id number] communication and monitoring of a patient's care needs. It is written in medical language and may contain abbreviations or verbiage that are unfamiliar. It may appear blunt or direct.  Medical documents are intended to carry relevant information, facts as evident and the clinical opinion of the practitioner.

## 2023-10-11 ENCOUNTER — TELEPHONE (OUTPATIENT)
Dept: INTERNAL MEDICINE CLINIC | Facility: CLINIC | Age: 73
End: 2023-10-11

## 2023-10-11 NOTE — TELEPHONE ENCOUNTER
Spoke with patient and she states that her B/P was 144/102 at her eye doctor this morning. Patient notes that she has been experiencing HA's and slightly elevated B/P readings since Monday. Monday 10/9 B/P 155/unknown  Tuesday 10/10 150/92  Wednesday (today) prior to eye doctor appointment 149/61. B/P after eye doctor appointment at home was 142/97. Pulse in the 50's. Patient notes HA's daily for the past 3 days. Denies CP, SOB, dizziness, or vision changes. She currently takes Dyazide. No hx of CVA. Patient scheduled an appointment with Dr. Dani Verduzco on Friday 10/13 at 11am.  Patient notified to call back sooner or seek treatment with any new or worsening symptoms prior to appointment. Patient verbalized an understanding and agreement to this plan.       FYI to Dr. Dani Verduzco

## 2023-10-11 NOTE — TELEPHONE ENCOUNTER
Patient is calling with high blood pressure. Patient was seen today by her eye doctor and was informed her blood pressure was high and to call our office.       # [phone]

## 2023-10-13 ENCOUNTER — OFFICE VISIT (OUTPATIENT)
Dept: INTERNAL MEDICINE CLINIC | Facility: CLINIC | Age: 73
End: 2023-10-13
Payer: MEDICARE

## 2023-10-13 VITALS
SYSTOLIC BLOOD PRESSURE: 120 MMHG | HEART RATE: 61 BPM | BODY MASS INDEX: 26.16 KG/M2 | TEMPERATURE: 98 F | WEIGHT: 157 LBS | DIASTOLIC BLOOD PRESSURE: 82 MMHG | HEIGHT: 65 IN | OXYGEN SATURATION: 100 %

## 2023-10-13 DIAGNOSIS — I10 ESSENTIAL HYPERTENSION: Primary | ICD-10-CM

## 2023-10-13 PROCEDURE — 1126F AMNT PAIN NOTED NONE PRSNT: CPT | Performed by: INTERNAL MEDICINE

## 2023-10-13 PROCEDURE — 1160F RVW MEDS BY RX/DR IN RCRD: CPT | Performed by: INTERNAL MEDICINE

## 2023-10-13 PROCEDURE — 1159F MED LIST DOCD IN RCRD: CPT | Performed by: INTERNAL MEDICINE

## 2023-10-13 PROCEDURE — 3079F DIAST BP 80-89 MM HG: CPT | Performed by: INTERNAL MEDICINE

## 2023-10-13 PROCEDURE — 3008F BODY MASS INDEX DOCD: CPT | Performed by: INTERNAL MEDICINE

## 2023-10-13 PROCEDURE — 99214 OFFICE O/P EST MOD 30 MIN: CPT | Performed by: INTERNAL MEDICINE

## 2023-10-13 PROCEDURE — 3074F SYST BP LT 130 MM HG: CPT | Performed by: INTERNAL MEDICINE

## 2023-10-17 ENCOUNTER — TELEPHONE (OUTPATIENT)
Dept: INTERNAL MEDICINE CLINIC | Facility: CLINIC | Age: 73
End: 2023-10-17

## 2023-10-17 ENCOUNTER — TELEPHONE (OUTPATIENT)
Dept: PHYSICAL THERAPY | Facility: HOSPITAL | Age: 73
End: 2023-10-17

## 2023-10-17 NOTE — TELEPHONE ENCOUNTER
Patient is calling in June Dr Bijal Wallace wrote an order for Pelvic Floor Therapy. Patient never did the therapy.     Patient called to schedule there therapy and was told it needs to be written by Dr Sona López since she is the PCP    Please call patient when order is complete [phone]

## 2023-10-18 NOTE — TELEPHONE ENCOUNTER
Switched order to Dr. Becca Amaya as ordering physician / now new PCP.  Routed to [address] to work on approval. Thank you

## 2023-10-20 ENCOUNTER — TELEPHONE (OUTPATIENT)
Dept: PHYSICAL THERAPY | Facility: HOSPITAL | Age: 73
End: 2023-10-20

## 2023-10-27 ENCOUNTER — LAB ENCOUNTER (OUTPATIENT)
Dept: LAB | Facility: HOSPITAL | Age: 73
End: 2023-10-27
Attending: INTERNAL MEDICINE

## 2023-10-27 LAB
ALBUMIN SERPL-MCNC: 3.7 G/DL (ref 3.4–5)
ALBUMIN/GLOB SERPL: 1 {RATIO} (ref 1–2)
ALP LIVER SERPL-CCNC: 79 U/L
ALT SERPL-CCNC: 24 U/L
ANION GAP SERPL CALC-SCNC: 5 MMOL/L (ref 0–18)
AST SERPL-CCNC: 20 U/L (ref 15–37)
BASOPHILS # BLD AUTO: 0.03 X10(3) UL (ref 0–0.2)
BASOPHILS NFR BLD AUTO: 0.7 %
BILIRUB SERPL-MCNC: 0.6 MG/DL (ref 0.1–2)
BILIRUB UR QL: NEGATIVE
BUN BLD-MCNC: 15 MG/DL (ref 7–18)
BUN/CREAT SERPL: 18.3 (ref 10–20)
CALCIUM BLD-MCNC: 9.4 MG/DL (ref 8.5–10.1)
CHLORIDE SERPL-SCNC: 107 MMOL/L (ref 98–112)
CHOLEST SERPL-MCNC: 171 MG/DL (ref ?–200)
CLARITY UR: CLEAR
CO2 SERPL-SCNC: 30 MMOL/L (ref 21–32)
CREAT BLD-MCNC: 0.82 MG/DL
DEPRECATED RDW RBC AUTO: 45.7 FL (ref 35.1–46.3)
EGFRCR SERPLBLD CKD-EPI 2021: 77 ML/MIN/1.73M2 (ref 60–?)
EOSINOPHIL # BLD AUTO: 0.14 X10(3) UL (ref 0–0.7)
EOSINOPHIL NFR BLD AUTO: 3.4 %
ERYTHROCYTE [DISTWIDTH] IN BLOOD BY AUTOMATED COUNT: 12.7 % (ref 11–15)
FASTING PATIENT LIPID ANSWER: YES
FASTING STATUS PATIENT QL REPORTED: YES
GLOBULIN PLAS-MCNC: 3.6 G/DL (ref 2.8–4.4)
GLUCOSE BLD-MCNC: 88 MG/DL (ref 70–99)
GLUCOSE UR-MCNC: NORMAL MG/DL
HCT VFR BLD AUTO: 43.8 %
HDLC SERPL-MCNC: 61 MG/DL (ref 40–59)
HGB BLD-MCNC: 14.2 G/DL
HGB UR QL STRIP.AUTO: NEGATIVE
IMM GRANULOCYTES # BLD AUTO: 0.01 X10(3) UL (ref 0–1)
IMM GRANULOCYTES NFR BLD: 0.2 %
KETONES UR-MCNC: NEGATIVE MG/DL
LDLC SERPL CALC-MCNC: 98 MG/DL (ref ?–100)
LEUKOCYTE ESTERASE UR QL STRIP.AUTO: NEGATIVE
LYMPHOCYTES # BLD AUTO: 1.37 X10(3) UL (ref 1–4)
LYMPHOCYTES NFR BLD AUTO: 33.7 %
MCH RBC QN AUTO: 31.8 PG (ref 26–34)
MCHC RBC AUTO-ENTMCNC: 32.4 G/DL (ref 31–37)
MCV RBC AUTO: 98 FL
MONOCYTES # BLD AUTO: 0.34 X10(3) UL (ref 0.1–1)
MONOCYTES NFR BLD AUTO: 8.4 %
NEUTROPHILS # BLD AUTO: 2.17 X10 (3) UL (ref 1.5–7.7)
NEUTROPHILS # BLD AUTO: 2.17 X10(3) UL (ref 1.5–7.7)
NEUTROPHILS NFR BLD AUTO: 53.6 %
NITRITE UR QL STRIP.AUTO: NEGATIVE
NONHDLC SERPL-MCNC: 110 MG/DL (ref ?–130)
OSMOLALITY SERPL CALC.SUM OF ELEC: 294 MOSM/KG (ref 275–295)
PH UR: 6.5 [PH] (ref 5–8)
PLATELET # BLD AUTO: 249 10(3)UL (ref 150–450)
POTASSIUM SERPL-SCNC: 3.7 MMOL/L (ref 3.5–5.1)
PROT SERPL-MCNC: 7.3 G/DL (ref 6.4–8.2)
PROT UR-MCNC: NEGATIVE MG/DL
RBC # BLD AUTO: 4.47 X10(6)UL
SODIUM SERPL-SCNC: 142 MMOL/L (ref 136–145)
SP GR UR STRIP: 1.01 (ref 1–1.03)
TRIGL SERPL-MCNC: 62 MG/DL (ref 30–149)
TSI SER-ACNC: 2.35 MIU/ML (ref 0.36–3.74)
UROBILINOGEN UR STRIP-ACNC: NORMAL
VLDLC SERPL CALC-MCNC: 10 MG/DL (ref 0–30)
WBC # BLD AUTO: 4.1 X10(3) UL (ref 4–11)

## 2023-10-27 PROCEDURE — 80061 LIPID PANEL: CPT | Performed by: INTERNAL MEDICINE

## 2023-10-27 PROCEDURE — 36415 COLL VENOUS BLD VENIPUNCTURE: CPT | Performed by: INTERNAL MEDICINE

## 2023-10-27 PROCEDURE — 80053 COMPREHEN METABOLIC PANEL: CPT | Performed by: INTERNAL MEDICINE

## 2023-10-27 PROCEDURE — 81003 URINALYSIS AUTO W/O SCOPE: CPT | Performed by: INTERNAL MEDICINE

## 2023-10-27 PROCEDURE — 84443 ASSAY THYROID STIM HORMONE: CPT | Performed by: INTERNAL MEDICINE

## 2023-10-27 PROCEDURE — 85025 COMPLETE CBC W/AUTO DIFF WBC: CPT | Performed by: INTERNAL MEDICINE

## 2023-11-06 ENCOUNTER — OFFICE VISIT (OUTPATIENT)
Dept: INTERNAL MEDICINE CLINIC | Facility: CLINIC | Age: 73
End: 2023-11-06

## 2023-11-06 VITALS
DIASTOLIC BLOOD PRESSURE: 72 MMHG | WEIGHT: 158.63 LBS | BODY MASS INDEX: 26.43 KG/M2 | HEART RATE: 67 BPM | OXYGEN SATURATION: 100 % | HEIGHT: 65 IN | TEMPERATURE: 98 F | SYSTOLIC BLOOD PRESSURE: 134 MMHG

## 2023-11-06 DIAGNOSIS — Z00.00 PHYSICAL EXAM, ANNUAL: ICD-10-CM

## 2023-11-06 DIAGNOSIS — Z00.00 PHYSICAL EXAM: Primary | ICD-10-CM

## 2024-04-16 RX ORDER — TRIAMTERENE AND HYDROCHLOROTHIAZIDE 37.5; 25 MG/1; MG/1
1 TABLET ORAL DAILY
Qty: 90 TABLET | Refills: 3 | Status: SHIPPED | OUTPATIENT
Start: 2024-04-16

## 2024-04-16 NOTE — TELEPHONE ENCOUNTER
Refill request is for a maintenance medication and has met the criteria specified in the Ambulatory Medication Refill Standing Order for eligibility, visits, laboratory, alerts and was sent to the requested pharmacy.    Requested Prescriptions     Signed Prescriptions Disp Refills    TRIAMTERENE-HCTZ 37.5-25 MG Oral Tab 90 tablet 3     Sig: TAKE 1 TABLET BY MOUTH DAILY     Authorizing Provider: ZENY LEYVA     Ordering User: MARILIN MURO

## 2024-04-17 NOTE — TELEPHONE ENCOUNTER
To Dr. Simona Graves - see pt message below. Last dexascan: 4/22/16. Pt would like dexascan ordered, will schedule now to be done when she returns from [place] in May. Pt has been taking 600mg calcium + 400 iu vitamin D - drinks milk, eats yogurt every day. Home

## 2024-05-13 ENCOUNTER — OFFICE VISIT (OUTPATIENT)
Dept: INTERNAL MEDICINE CLINIC | Facility: CLINIC | Age: 74
End: 2024-05-13

## 2024-05-13 VITALS
HEART RATE: 62 BPM | SYSTOLIC BLOOD PRESSURE: 128 MMHG | WEIGHT: 159.81 LBS | TEMPERATURE: 98 F | DIASTOLIC BLOOD PRESSURE: 86 MMHG | HEIGHT: 65.1 IN | OXYGEN SATURATION: 98 % | BODY MASS INDEX: 26.63 KG/M2

## 2024-05-13 DIAGNOSIS — Z12.31 ENCOUNTER FOR SCREENING MAMMOGRAM FOR MALIGNANT NEOPLASM OF BREAST: ICD-10-CM

## 2024-05-13 DIAGNOSIS — R32 URINARY INCONTINENCE, UNSPECIFIED TYPE: ICD-10-CM

## 2024-05-13 DIAGNOSIS — Z00.00 MEDICARE ANNUAL WELLNESS VISIT, SUBSEQUENT: Primary | ICD-10-CM

## 2024-05-13 NOTE — PROGRESS NOTES
Subjective:   April Brown is a 73 year old female who presents for a Medicare Subsequent Annual Wellness visit (Pt already had Initial Annual Wellness) and scheduled follow up of multiple significant but stable problems.     LOV 11/6/23.    Since, she has been doing well.    No complaints no concerns today.    Asking for an order for pelvic floor therapy for stress incontinence.     HTN - maxzide 37.5/25 mg daily.     Chronic episodic hemoptysis - hasn't had this in awhile    History/Other:   Fall Risk Assessment:   She has been screened for Falls and is low risk.      Cognitive Assessment:   She had a completely normal cognitive assessment - see flowsheet entries     Functional Ability/Status:   April Brown has a completely normal functional assessment. See flowsheet for details.    Depression Screening (PHQ-2/PHQ-9): PHQ-2 SCORE: 0  , done 5/13/2024     5 minutes spent screening and counseling for depression    Advanced Directives:   She does have a Living Will but we do NOT have it on file in Epic.    She does have a POA but we do NOT have it on file in Epic.    Discussed Advance Care Planning with patient (and family/surrogate if present). Standard forms made available to patient in After Visit Summary.      Patient Active Problem List   Diagnosis    Essential hypertension    History of breast cancer    Primary osteoarthritis of right knee    Osteopenia     Allergies:  She is allergic to adapalene.    Current Medications:  Outpatient Medications Marked as Taking for the 5/13/24 encounter (Office Visit) with Bryanna Espinosa,    Medication Sig    TRIAMTERENE-HCTZ 37.5-25 MG Oral Tab TAKE 1 TABLET BY MOUTH DAILY    Multiple Vitamin (MULTI-VITAMIN DAILY) Oral Tab Take 1 tablet by mouth daily.    Omega-3 Fatty Acids (FISH OIL OR) Take 1 capsule by mouth daily.    Calcium Carbonate-Vitamin D (CALCIUM + D OR) Take 1 tablet by mouth daily.       Medical History:  She  has a past medical history of Breast cancer  (HCC) (1997), Diverticulosis (2011), Essential hypertension, Exposure to medical diagnostic radiation, Frequent headaches (2002), Hemoptysis (2012), Hemoptysis (2012 and 2018), High blood pressure, Hypertension, essential (2006), Incontinence, Internal hemorrhoids (2003), Microscopic hematuria (2009), Osteoarthritis, Personal history of antineoplastic chemotherapy, Right knee DJD (2017), Visual impairment, and Vitamin D deficiency (2013).  Surgical History:  She  has a past surgical history that includes electrocardiogram, complete (04-); hysteroscopy (1998); d & c (1998); colonoscopy (12/2011); lumpectomy left (1997); chemotherapy (1997); radiation left (1997); colonoscopy (03/2019); dilation/curettage,diagnostic; and elbow surgery (Left, 11/05/2020).   Family History:  Her family history includes Alcohol and Other Disorders Associated in her father; Breast Cancer in her self; Cancer in her brother and father; Cancer (age of onset: 61) in her brother; Dementia (age of onset: 77) in her brother; Depression in her father; Heart Disorder in her mother; Other in her brother.  Social History:  She  reports that she has never smoked. She has been exposed to tobacco smoke. She has never used smokeless tobacco. She reports that she does not drink alcohol and does not use drugs.    Tobacco:  She has never smoked tobacco.    CAGE Alcohol Screen:   CAGE screening score of 0 on 5/13/2024, showing low risk of alcohol abuse.      Patient Care Team:  Bryanna Espinosa DO as PCP - General (Internal Medicine)  Benny Mckinnon MD (GASTROENTEROLOGY)    Review of Systems  GENERAL: feels well otherwise  SKIN: denies any unusual skin lesions  EYES: denies blurred vision or double vision  HEENT: denies nasal congestion, sinus pain or ST  LUNGS: denies shortness of breath with exertion  CARDIOVASCULAR: denies chest pain on exertion  GI: denies abdominal pain, denies heartburn  : denies dysuria, + urinary stress  incontinence  MUSCULOSKELETAL: denies back pain  NEURO: denies headaches  PSYCHE: denies depression     Objective:   Physical Exam  General Appearance:  Alert, cooperative, no distress, appears stated age   Head:  Normocephalic, without obvious abnormality, atraumatic   Eyes:  PERRL, conjunctiva/corneas clear, EOM's intact both eyes   Ears:  Normal TM's and external ear canals, both ears   Nose: Nares normal, septum midline,mucosa normal, no drainage or sinus tenderness   Throat: Lips, mucosa, and tongue normal; teeth and gums normal   Neck: Supple, symmetrical, trachea midline, no adenopathy;  thyroid: not enlarged, symmetric, no tenderness/mass/nodules; no carotid bruit or JVD       Lungs:   Clear to auscultation bilaterally, respirations unlabored   Heart:  Regular rate and rhythm, S1 and S2 normal, no murmur, rub, or gallop   Abdomen:   Soft, non-tender, bowel sounds active all four quadrants,  no masses, no organomegaly   Pelvic: Deferred   Extremities: Extremities normal, atraumatic, no cyanosis or edema   Pulses: 2+ and symmetric   Skin: Skin color, texture, turgor normal, no rashes or lesions   Lymph nodes: Cervical, supraclavicular nodes normal   Neurologic: Normal       /86   Pulse 62   Temp 98 °F (36.7 °C) (Oral)   Ht 5' 5.1\" (1.654 m)   Wt 159 lb 12.8 oz (72.5 kg)   SpO2 98%   BMI 26.51 kg/m²  Estimated body mass index is 26.51 kg/m² as calculated from the following:    Height as of this encounter: 5' 5.1\" (1.654 m).    Weight as of this encounter: 159 lb 12.8 oz (72.5 kg).    Medicare Hearing Assessment:   Hearing Screening    Time taken: 5/13/2024  3:49 PM  Entry User: Rochelle Brewer RN  Screening Method: Questionnaire  I have a problem hearing over the telephone: No I have trouble following the conversations when two or more people are talking at the same time: Sometimes   I have trouble understanding things on the TV: No I have to strain to understand conversations: Sometimes   I have  to worry about missing the telephone ring or doorbell: No I have trouble hearing conversations in a noisy background such as a crowded room or restaurant: Sometimes   I get confused about where sounds come from: No I misunderstand some words in a sentence and need to ask people to repeat themselves: No   I especially have trouble understanding the speech of women and children: No I have trouble understanding the speaker in a large room such as at a meeting or place of [employer]: No   Many people I talk to seem to mumble (or don't speak clearly): No People get annoyed because I misunderstand what they say: No   I misunderstand what others are saying and make inappropriate responses: No I avoid social activities because I cannot hear well and fear I will reply improperly: No   Family members and friends have told me they think I may have hearing loss: No             Visual Acuity:   Right Eye Visual Acuity: Corrected Right Eye Chart Acuity: 20/25     Left Eye Chart Acuity: 20/25   Both Eyes Visual Acuity: Corrected Both Eyes Chart Acuity: 20/20   Able To Tolerate Visual Acuity: Yes        Assessment & Plan:   April Brown is a 73 year old female who presents for a Medicare Assessment.     Medicare annual wellness visit, subsequent (Primary)  -     CBC With Differential With Platelet; Future; Expected date: 05/13/2024  -     Comp Metabolic Panel (14); Future; Expected date: 05/13/2024  -     TSH W Reflex To Free T4; Future; Expected date: 05/13/2024  -     Lipid Panel; Future; Expected date: 05/13/2024  -     Urinalysis with Culture Reflex; Future; Expected date: 05/13/2024    Encounter for screening mammogram for malignant neoplasm of breast  -     [facility] KATT 2D+3D SCREENING BILAT (CPT=77067/34273); Future; Expected date: 05/13/2024    Urinary incontinence, unspecified type  -     Pelvic Floor Therapy - [place] Location    Other orders  -     TETANUS, DIPHTHERIA TOXOIDS AND ACELLULAR PERTUSIS VACCINE (TDAP), >7 YEARS,  IM USE    Hypertension  - controlled on current regimen:  - Maxzide 37.5/25 mg daily     R knee DJD  - 12/19/17-xray R knee - narrowing medial joint space and small joint effusion.   - Had MRI per Dr. Waters 2020 showed meniscal tear.     Urinary incontinence  - pelvic floor therapy ordered as above     Hx hemoptysis, for yrs  - 2012, 10/2018 and 2020. By hx is from nasopharynx.   - CXR 10/31/18-ok--declined repeat CXR..  flex laryngoscopy 11/1/18 Dr. Tanner- irritation in nasopharynx.   - Repeat laryngoscopy 8/10/19 Dr. Moore, erythema larynx.   - Was to RTC, declines to return  - Resolved per pt at 11/22/22, 11/6/23 visits     Osteopenia  - Weight bearing exercises and Ca/vit D recommended    History L breast cancer     Healthcare Maintenance  Cancer Screenings:  - Breast: birads 2 on 8/18/23, repeat ordered today  - Cervical: GYN per Dr. Chery  - Colon: Colonoscopy 3/5/19 w/Dr. Mckinnon: diverticulosis and hemorrhoids. Next in 2029     Vaccines:  - Tdap: 10/24/13, repeat ordered today  - Shingles: zostavax 8/2014, shingrix recommended  - Pneumonia: prevnar 4/3/18, pneumovax 11/15/19  - Flu: recommend yearly  - COVID-19: x5     Misc:  - DEXA: 9/5/23 w/osteopenia  - Advanced directives: discussed, patient has HCPOA, states she will bring it in     Goes to [place] Dec 12/2 to May. PCP there is Dr. Mendiola in [place], [place].     RTC in 1 year or sooner PRN (plans to see PCP in [place] ~12/2024)    Overall 30 minutes was spent on this encounter. 15 minutes spent reviewing, providing care coordination, and documentation of the acute/chronic conditions as listed above. 15 minutes was spent reviewing elements of a AWV and providing age appropriate screenings.     The patient indicates understanding of these issues and agrees to the plan.    Reinforced healthy diet, lifestyle, and exercise.      No follow-ups on file.     Bryanna Espinosa DO, 5/13/2024     Supplementary Documentation:   General Health:  In the past six months,  have you lost more than 10 pounds without trying?: 2 - No  Has your appetite been poor?: No  Type of Diet: Balanced  How does the patient maintain a good energy level?: Daily Walks  How would you describe your daily physical activity?: Moderate  How do you maintain positive mental well-being?: Social Interaction;Visiting Family;Puzzles;Games;Visiting Friends  On a scale of 0 to 10, with 0 being no pain and 10 being severe pain, what is your pain level?: 0 - (None)  In the past six months, have you experienced urine leakage?: 1-Yes  At any time do you feel concerned for the safety/well-being of yourself and/or your children, in your home or elsewhere?: No  Have you had any immunizations at another office such as Influenza, Hepatitis B, Tetanus, or Pneumococcal?: Marilin Brown's SCREENING SCHEDULE   Tests on this list are recommended by your physician but may not be covered, or covered at this frequency, by your insurer.   Please check with your insurance carrier before scheduling to verify coverage.   PREVENTATIVE SERVICES FREQUENCY &  COVERAGE DETAILS LAST COMPLETION DATE   Diabetes Screening    Fasting Blood Sugar /  Glucose    One screening every 12 months if never tested or if previously tested but not diagnosed with pre-diabetes   One screening every 6 months if diagnosed with pre-diabetes Lab Results   Component Value Date    GLU 88 10/27/2023        Cardiovascular Disease Screening    Lipid Panel  Cholesterol  Lipoprotein (HDL)  Triglycerides Covered every 5 years for all Medicare beneficiaries without apparent signs or symptoms of cardiovascular disease Lab Results   Component Value Date    CHOLEST 171 10/27/2023    HDL 61 (H) 10/27/2023    LDL 98 10/27/2023    TRIG 62 10/27/2023         Electrocardiogram (EKG)   Covered if needed at Welcome to Medicare, and non-screening if indicated for medical reasons -      Ultrasound Screening for Abdominal Aortic Aneurysm (AAA) Covered once in a lifetime for  one of the following risk factors    Men who are 65-75 years old and have ever smoked    Anyone with a family history -     Colorectal Cancer Screening  Covered for ages 50-85; only need ONE of the following:    Colonoscopy   Covered every 10 years    Covered every 2 years if patient is at high risk or previous colonoscopy was abnormal 03/05/2019    Health Maintenance   Topic Date Due    Colorectal Cancer Screening  03/05/2029       Flexible Sigmoidoscopy   Covered every 4 years -    Fecal Occult Blood Test Covered annually -   Bone Density Screening    Bone density screening    Covered every 2 years after age 65 if diagnosed with risk of osteoporosis or estrogen deficiency.    Covered yearly for long-term glucocorticoid medication use (Steroids) Last Dexa Scan:    XR DEXA BONE DENSITOMETRY (CPT=77080) 09/05/2023      No recommendations at this time   Pap and Pelvic    Pap   Covered every 2 years for women at normal risk; Annually if at high risk -  No recommendations at this time    Chlamydia Annually if high risk -  No recommendations at this time   Screening Mammogram    Mammogram     Recommend annually for all female patients aged 40 and older    One baseline mammogram covered for patients aged 35-39 08/18/2023    Health Maintenance   Topic Date Due    Mammogram  08/18/2024       Immunizations    Influenza Covered once per flu season  Please get every year -  No recommendations at this time    Pneumococcal Each vaccine ([id number] & [id number]) covered once after 65 Prevnar 13: 04/03/2018    [id number]: 11/15/2019     No recommendations at this time    Hepatitis B One screening covered for patients with certain risk factors   -  No recommendations at this time    Tetanus Toxoid Not covered by Medicare Part B unless medically necessary (cut with metal); may be covered with your pharmacy prescription benefits -    Tetanus, Diptheria and Pertusis TD and TDaP Not covered by Medicare Part B -  No recommendations at  this time    Zoster Not covered by Medicare Part B; may be covered with your pharmacy  prescription benefits 08/01/2014  Zoster Vaccines(2 of 3) due on 09/26/2014     Annual Monitoring of Persistent Medications (ACE/ARB, digoxin diuretics, anticonvulsants)    Potassium Annually Lab Results   Component Value Date    K 3.7 10/27/2023         Creatinine   Annually Lab Results   Component Value Date    CREATSERUM 0.82 10/27/2023         BUN Annually Lab Results   Component Value Date    BUN 15 10/27/2023       Drug Serum Conc Annually No results found for: \"DIGOXIN\", \"DIG\", \"VALP\"

## 2024-07-12 ENCOUNTER — TELEPHONE (OUTPATIENT)
Dept: PHYSICAL THERAPY | Facility: HOSPITAL | Age: 74
End: 2024-07-12

## 2024-07-24 ENCOUNTER — APPOINTMENT (OUTPATIENT)
Dept: PHYSICAL THERAPY | Age: 74
End: 2024-07-24
Attending: INTERNAL MEDICINE
Payer: MEDICARE

## 2024-07-25 ENCOUNTER — TELEPHONE (OUTPATIENT)
Dept: PHYSICAL THERAPY | Facility: HOSPITAL | Age: 74
End: 2024-07-25

## 2024-07-30 ENCOUNTER — OFFICE VISIT (OUTPATIENT)
Dept: PHYSICAL THERAPY | Age: 74
End: 2024-07-30
Attending: INTERNAL MEDICINE
Payer: MEDICARE

## 2024-07-30 ENCOUNTER — APPOINTMENT (OUTPATIENT)
Dept: PHYSICAL THERAPY | Age: 74
End: 2024-07-30
Attending: INTERNAL MEDICINE
Payer: MEDICARE

## 2024-07-30 DIAGNOSIS — R32 URINARY INCONTINENCE, UNSPECIFIED TYPE: Primary | ICD-10-CM

## 2024-07-30 PROCEDURE — 97161 PT EVAL LOW COMPLEX 20 MIN: CPT

## 2024-07-30 PROCEDURE — 97110 THERAPEUTIC EXERCISES: CPT

## 2024-07-30 PROCEDURE — 97530 THERAPEUTIC ACTIVITIES: CPT

## 2024-07-30 NOTE — PROGRESS NOTES
PELVIC FLOOR EVALUATION:   Referring Provider: Francisca  Diagnosis: Urinary incontinence, unspecified type (R32)         Date of onset: past few years    Precautions:  None Date of Service: 7/30/2024      PATIENT SUMMARY   April Brown is a 73 year old female  who presents to therapy today with complaints of DASHA for the past few years. Pt can have leakage with sneeze, cough, walking. Pt has a lot of R hip stiffness, and hx of R tailbone pain.   Current symptoms include: incomplete emptying and leakage  Pt describes pain level: no pain  Quality: no    PMH: Past medical history was reviewed with April. Significant findings include  has a past medical history of Breast cancer (HCC) (1997), Diverticulosis (2011), Essential hypertension, Exposure to medical diagnostic radiation, Frequent headaches (2002), Hemoptysis (2012), Hemoptysis (2012 and 2018), High blood pressure, Hypertension, essential (2006), Incontinence, Internal hemorrhoids (2003), Microscopic hematuria (2009), Osteoarthritis, Personal history of antineoplastic chemotherapy, Right knee DJD (2017), Visual impairment, and Vitamin D deficiency (2013).   PSH: Past surgical history was reviewed with April. Significant findings include   Past Surgical History:   Procedure Laterality Date    Chemotherapy  1997    Colonoscopy  12/2011    Colonoscopy  03/2019    D & c  1998    Hysteroscopy/D&C    Dilation/curettage,diagnostic      Elbow surgery Left 11/05/2020    ORIF L olecranon fracture 11/5/20 -Dr Waters.     Electrocardiogram, complete  04-    Scanned to media tab 04-    Hysteroscopy  1998    Hysteroscopy/D&C    Lumpectomy left  1997    Radiation left  1997       PLOF: April describes prior level of function has been leaking for years, and last year at yearly doctor visit discussed option for pelvic PT, now pursuing.   Occupation/Activities: Pt is retired . Walks 4-5 miles several times per week.   Patient GOALS: Pt goals include to no  longer have leakage with exercise or daily activities.    Obstetrical/Gynecological history:  Pregnant Now: No   2/Para2  Complications during pregnancy/delivery: both vaginal, no complications  Last menstrual period: menopause prior to 52, early 40s had breast cancer and had menopause after that     URINARY HABITS  Types of symptoms: stress incontinence, incomplete emptying, and nocturia  Events associated with the onset of urinary complaints: started post-menopausal  Urodynamic Testing completed: no  Abdominal/Vaginal Pressure complaints: no   Urinary Frequency: varies  Urine Stream: normal  Amount: normal  Leaking occurs: sneeze, cough, laugh, walking  Episodes of Leakage: 2+ times per day  Amount of leakage: varies but minimal  Pad use: no  Pad Change frequency: n/a  Nocturia: 1-2x/night  Fluid Intake: unsure  Bladder irritants: coffee 2 cups,   Urine Stop test: I think so  Post void dribble: no  Hovering: no  Empty bladder just in case: no  Do you ever leak urine without knowing it? no     BOWEL HABITS  Types of symptoms: hx of Constipation   Frequency of bowel movements: daily  Stool consistency: Webster Stool Scale: 3  Do you strain with defecation: Yes   Laxative/Stool softener use: No  Taking fiber supplement: No     SEXUAL HEALTH STATUS  Defer to future session     ASSESSMENT  April presents to physical therapy evaluation with primary c/o DASHA. The results of the objective tests and measures show decreased R hip ROM and strength.  Functional deficits include but are not limited to inability to hold back urine consistently with exercise and changes in IAP.  Signs and symptoms are consistent with diagnosis of urinary incontinence. Pt and PT discussed evaluation findings, pathology, POC and HEP.  Pt voiced understanding and performs HEP correctly without reported pain. Skilled Pelvic Physical Therapy is medically necessary to address the above impairments and reach functional goals.     OBJECTIVE:      Gait: WNL    Range Of Motion  Lumbar AROM screen: WNL  LE AROM screen: grossly WNL except: R hip ER/IR mild moore    Flexibility Summary: WNL LYNDON LE except B hamstrings mild moore     Strength (MMT) 5/5 LYNDON LE except R hip abd 4/5, R hip add 4/5, R glute 4/5     Palpation: no pain at B hip regions    Special Tests: none     Functional screen:  Double leg squat: overall WNL, some decreased R hip flex  Single leg squat: not tested  Single leg stance: R: 4 sec, L: WNL     Informed consent for internal pelvic evaluation given: No  Pt deferred to a future session  Today's Treatment and Response:   Patient education was provided on objective findings of external and internal evaluation and expectations with treatment outcomes.   Theract: (15 min) Educated on pelvic anatomy and function with diagrams and pelvic model, bladder normatives, adequate hydration levels, proper toileting posture, instructed in bladder, bowel, and diet diary log and issued handout , and diaphragmatic breathing for PNS activation and pelvic floor relaxation   Therex: (8 min) seated fig 4, seated hamstring, bridge with hip add + exhale     HEP: Patient was instructed in and issued a HEP for: 24 hour bladder diary  Access Code: [email or web address]  URL: [email or web address]  Date: 07/31/2024  Prepared by: Zina Wong  Exercises  - Seated Piriformis Stretch with Trunk Bend  - 1 x daily - 7 x weekly - 1 sets - 3 reps - 30 sec hold  - Seated Hamstring Stretch  - 1 x daily - 7 x weekly - 1 sets - 3 reps - 30 sec hold  - Supine Bridge with Mini Swiss Ball Between Knees  - 1 x daily - 7 x weekly - 1 sets - 10 reps  Patient Education  - Get To Know Your Pelvic Floor- Female  - What is Urinary Incontinence?  - Lifestyle Changes that Support Urinary Health  - Lifestyle Changes to Support Urinary and Bladder Health  - Bladder Log     Charges: PT Eval Low Complexity, 1 TA, 1TE      Total Timed Treatment: 23 min     Total Treatment Time: 45 min       Based on clinical rationale and outcome measures, this evaluation involved Low Complexity decision making due to 1-2 personal factors/comorbidities, 3 body structures involved/activity limitations, and unstable symptoms including stress urinary incontinence  PLAN OF CARE:    Goals: (to be met in 10 visits)  Patient instructed on bladder irritants, increased water intake to 64 ounces /day     Patient to report urinary frequency to every 2-4 hours to allow for independent ADLs    Patient reports a reduction in DASHA from 3+x/ day to 0-1x/ day resulting in no need for pad use.     Patient is able to perform Levator Ani contraction inverse to diaphragmatic breathing to allow for pelvic brace with ADLs without valsalva.     PF strength goal TBD      Patient reports change in Daggett stool to #4 with daily bowel moment without straining for improved bowel function.       Patient understands importance of performing HEP to prevent reoccurrence of symptoms.    Pt with B hip strength 5/5 in order to decreased the demand on pelvic floor muscles with functional squat and lifting a load from ground level.        Frequency / Duration: Patient will be seen for 1x/week or a total of 10 visits over a 90 day period.  Treatment will include: Gait training, Manual Therapy, Neuromuscular Re-education, Self-Care Home Management, Therapeutic Activities, Therapeutic Exercise, and Home Exercise Program instruction      Education or treatment limitation: None  Rehab Potential:good        Patient/Family/Caregiver was advised of these findings, precautions, and treatment options and has agreed to actively participate in planning and for this course of care.     Thank you for your referral. Please co-sign or sign and return this letter via fax as soon as possible to [phone]. If you have any questions, please contact me at Dept: [phone]     Sincerely,  Electronically signed by therapist: Zina Wong, PT  Physician's certification  required: Yes  I certify the need for these services furnished under this plan of treatment and while under my care.     X___________________________________________________ Date____________________     Certification From: 7/30/2024  To:10/28/2024

## 2024-08-08 ENCOUNTER — OFFICE VISIT (OUTPATIENT)
Dept: PHYSICAL THERAPY | Age: 74
End: 2024-08-08
Attending: INTERNAL MEDICINE
Payer: MEDICARE

## 2024-08-08 DIAGNOSIS — R32 URINARY INCONTINENCE, UNSPECIFIED TYPE: Primary | ICD-10-CM

## 2024-08-08 PROCEDURE — 97530 THERAPEUTIC ACTIVITIES: CPT

## 2024-08-08 PROCEDURE — 97110 THERAPEUTIC EXERCISES: CPT

## 2024-08-08 NOTE — PROGRESS NOTES
Diagnosis:    Urinary incontinence, unspecified type (R32)   Referring Provider: Bryanna Espinosa  Date of Evaluation:    7/30/2024    Precautions:  None Next MD visit:   none scheduled  Date of Surgery: n/a   Insurance Primary/Secondary: YAEL BEATTY / N/A     # Auth Visits: latesha BEATTY thru 10/28/2024            Subjective: Pt completed bladder diary, doing stretches.     Pain: 0/10      Objective:   Bladder diary: 9 voids, 6-16 sec    Assessment: Pt would benefit from working toward increased water intake to consistently 64 ounces per day. Pt with education on breathing sequence, needed some reinforcement, but with repetitions had good carryover.       Goals: (to be met in 10 visits)  Patient instructed on bladder irritants, increased water intake to 64 ounces /day (in progress)     Patient to report urinary frequency to every 2-4 hours to allow for independent ADLs (in progress)  Patient reports a reduction in DASHA from 3+x/ day to 0-1x/ day resulting in no need for pad use. (in progress)    Patient is able to perform Levator Ani contraction inverse to diaphragmatic breathing to allow for pelvic brace with ADLs without valsalva. (in progress)     Patient reports change in [place] stool to #4 with daily bowel moment without straining for improved bowel function.       Patient understands importance of performing HEP to prevent reoccurrence of symptoms. (in progress)    Pt with B hip strength 5/5 in order to decreased the demand on pelvic floor muscles with functional squat and lifting a load from ground level.     Plan: direct and indirect PFM strengthening, hip strengthening  Date: 8/8/2024  TX#: 2 Date:                 TX#: 3/ Date:                 TX#: 4/ Date:                 TX#: 5/ Date:   Tx#: 6/   Therex: 10 min  Sit<>stand + exhale x10  Clams R/L x10  Reverse clams R/L x10       Theract: 30 min  Perpt req, pt ed on PFM anatomy, bladder function, urinary reflex, impact of bladder irritants and hydration on  bladder function                     HEP: Access Code: [email or web address]  URL: [email or web address]  Date: 08/08/2024  Prepared by: Zina Porod  Exercises  - Seated Piriformis Stretch with Trunk Bend  - 1 x daily - 7 x weekly - 1 sets - 3 reps - 30 sec hold  - Seated Hamstring Stretch  - 1 x daily - 7 x weekly - 1 sets - 3 reps - 30 sec hold  - Supine Bridge with Mini Swiss Ball Between Knees  - 1 x daily - 7 x weekly - 1 sets - 10 reps  - Sit to Stand with Pelvic Floor Contraction  - 1 x daily - 7 x weekly - 1 sets - 10 reps  - Clamshell  - 1 x daily - 7 x weekly - 1 sets - 10 reps  - Sidelying Reverse Clamshell  - 1 x daily - 7 x weekly - 1 sets - 10 reps      Charges: 2 TA, 1 TE       Total Timed Treatment: 38 min  Total Treatment Time: 38 min

## 2024-08-14 ENCOUNTER — OFFICE VISIT (OUTPATIENT)
Dept: PHYSICAL THERAPY | Age: 74
End: 2024-08-14
Attending: INTERNAL MEDICINE
Payer: MEDICARE

## 2024-08-14 DIAGNOSIS — R32 URINARY INCONTINENCE, UNSPECIFIED TYPE: Primary | ICD-10-CM

## 2024-08-14 PROCEDURE — 97110 THERAPEUTIC EXERCISES: CPT

## 2024-08-14 NOTE — PROGRESS NOTES
Diagnosis:    Urinary incontinence, unspecified type (R32)   Referring Provider: Bryanna Espinosa  Date of Evaluation:    7/30/2024    Precautions:  None Next MD visit:   none scheduled  Date of Surgery: n/a   Insurance Primary/Secondary: YAEL BEATTY / N/A     # Auth Visits: latesha BEATTY thru 10/28/2024            Subjective: Pt is feeling ok, doing HEP, a few questions on exercises.     Pain: 0/10      Objective: see chart below  Pt declined internal PFM assessment, pt requests to defer at this time and trial external and exercise based approach.     Assessment: Pt may have had some improved leakage this week, plan for pt to track leakage over the course of the next week to determine. Pt with good form on exercises, verbal cues for breathing sequence. Pt is motivated and completing HEP. Pt will continue to benefit from skilled PT intervention.       Goals: (to be met in 10 visits)  Patient instructed on bladder irritants, increased water intake to 64 ounces /day (in progress)     Patient to report urinary frequency to every 2-4 hours to allow for independent ADLs (in progress)  Patient reports a reduction in DASHA from 3+x/ day to 0-1x/ day resulting in no need for pad use. (in progress)    Patient is able to perform Levator Ani contraction inverse to diaphragmatic breathing to allow for pelvic brace with ADLs without valsalva. (in progress)     Patient reports change in [place] stool to #4 with daily bowel moment without straining for improved bowel function.       Patient understands importance of performing HEP to prevent reoccurrence of symptoms. (in progress)    Pt with B hip strength 5/5 in order to decreased the demand on pelvic floor muscles with functional squat and lifting a load from ground level.     Plan: direct and indirect PFM strengthening, hip strengthening  Date: 8/8/2024  TX#: 2 Date: 8/14/24               TX#: 3 Date:                 TX#: 4/ Date:                 TX#: 5/ Date:   Tx#: 6/   Therex: 10  min  Sit<>stand + exhale x10  Clams R/L x10  Reverse clams R/L x10 Therex: 38 min  Reviewed HEP  Recumbent bike L3 x5 min  Shuttle 7c DL x12, SL R/L x12  Calf stretch L2 x1 min  SL balance on airex R/L 30\" x3 ea  Bridge with hip add ball squeeze x10  Bridge with hip abd red TB x10  Supine OH reach 4lb med ball for abdominal activation x10      Theract: 30 min  Perpt req, pt ed on PFM anatomy, bladder function, urinary reflex, impact of bladder irritants and hydration on bladder function                     HEP: Access Code: [email or web address]  URL: [email or web address]  Date: 08/08/2024  Prepared by: Zina Porod  Exercises  - Seated Piriformis Stretch with Trunk Bend  - 1 x daily - 7 x weekly - 1 sets - 3 reps - 30 sec hold  - Seated Hamstring Stretch  - 1 x daily - 7 x weekly - 1 sets - 3 reps - 30 sec hold  - Supine Bridge with Mini Swiss Ball Between Knees  - 1 x daily - 7 x weekly - 1 sets - 10 reps  - Bridge with Hip Abduction and Resistance  - 1 x daily - 7 x weekly - 10 sets - 10 reps  - Sit to Stand with Pelvic Floor Contraction  - 1 x daily - 7 x weekly - 1 sets - 10 reps  - Clamshell  - 1 x daily - 7 x weekly - 1 sets - 10 reps  - Sidelying Reverse Clamshell  - 1 x daily - 7 x weekly - 1 sets - 10 reps      Charges: 3 TE       Total Timed Treatment: 38 min  Total Treatment Time: 38 min

## 2024-08-19 ENCOUNTER — HOSPITAL ENCOUNTER (OUTPATIENT)
Dept: MAMMOGRAPHY | Facility: HOSPITAL | Age: 74
Discharge: HOME | End: 2024-08-19
Attending: INTERNAL MEDICINE
Payer: MEDICARE

## 2024-08-19 DIAGNOSIS — Z12.31 ENCOUNTER FOR SCREENING MAMMOGRAM FOR MALIGNANT NEOPLASM OF BREAST: ICD-10-CM

## 2024-08-19 PROCEDURE — 77063 BREAST TOMOSYNTHESIS BI: CPT | Performed by: INTERNAL MEDICINE

## 2024-08-19 PROCEDURE — 77067 SCR MAMMO BI INCL CAD: CPT | Performed by: INTERNAL MEDICINE

## 2024-08-21 ENCOUNTER — OFFICE VISIT (OUTPATIENT)
Dept: PHYSICAL THERAPY | Age: 74
End: 2024-08-21
Attending: INTERNAL MEDICINE
Payer: MEDICARE

## 2024-08-21 DIAGNOSIS — R32 URINARY INCONTINENCE, UNSPECIFIED TYPE: Primary | ICD-10-CM

## 2024-08-21 PROCEDURE — 97112 NEUROMUSCULAR REEDUCATION: CPT

## 2024-08-21 PROCEDURE — 97110 THERAPEUTIC EXERCISES: CPT

## 2024-08-21 PROCEDURE — 97530 THERAPEUTIC ACTIVITIES: CPT

## 2024-08-21 NOTE — PROGRESS NOTES
Diagnosis:    Urinary incontinence, unspecified type (R32)   Referring Provider: Bryanna Espinosa  Date of Evaluation:    7/30/2024    Precautions:  None Next MD visit:   none scheduled  Date of Surgery: n/a   Insurance Primary/Secondary: YAEL BEATTY / N/A     # Auth Visits: latesha BEATTY thru 10/28/2024            Subjective: Pt is feeling ok, doing HEP, had a little.     Pain: 0/10      Objective: see chart below  Pt declined internal PFM assessment, pt requests to defer at this time and trial external and exercise based approach.       Assessment: Pt's leakage this week may have been impacted with increased bladder irritants from food sources like tomatoes, etc. Pt would benefit from monitoring symptoms as related to consumption of bladder irritants, and also starting the day with water vs coffee. Pt with good sequence of breathing with exercises about 75% of the time this session. Pt may benefit from addition of intentional activation of PFMs with HEP this week. Pt is motivated and completing HEP. Pt will continue to benefit from skilled PT intervention.       Goals: (to be met in 10 visits)  Patient instructed on bladder irritants, increased water intake to 64 ounces /day (in progress)     Patient to report urinary frequency to every 2-4 hours to allow for independent ADLs (in progress)  Patient reports a reduction in DASHA from 3+x/ day to 0-1x/ day resulting in no need for pad use. (in progress)    Patient is able to perform Levator Ani contraction inverse to diaphragmatic breathing to allow for pelvic brace with ADLs without valsalva. (in progress)     Patient reports change in [place] stool to #4 with daily bowel moment without straining for improved bowel function.  (in progress)     Patient understands importance of performing HEP to prevent reoccurrence of symptoms. (in progress)    Pt with B hip strength 5/5 in order to decreased the demand on pelvic floor muscles with functional squat and lifting a load from  ground level.  (in progress)    Plan: direct and indirect PFM strengthening, hip strengthening: hip abd with kegel, forward/lateral step ups with kegel, SL balance  Date: 8/8/2024  TX#: 2 Date: 8/14/24               TX#: 3 Date: 8/21/24               TX#: 4 Date:                 TX#: 5/ Date:   Tx#: 6/   Therex: 10 min  Sit<>stand + exhale x10  Clams R/L x10  Reverse clams R/L x10 Therex: 38 min  Reviewed HEP  Recumbent bike L3 x5 min  Shuttle 7c DL x12, SL R/L x12  Calf stretch L2 x1 min  SL balance on airex R/L 30\" x3 ea  Bridge with hip add ball squeeze x10  Bridge with hip abd red TB x10  Supine OH reach 4lb med ball for abdominal activation x10 Therex: 22 min  Reviewed HEP  Bridge with hip add ball squeeze + kegel x10  Bridge with hip abd ball squeeze + kegel x10  Sit<>stand with 10lb db OH reach with exhale + kegel x10  Shuttle +exhale+kegel DL 8c x10, SL R/L 7c x10     Theract: 30 min  Perpt req, pt ed on PFM anatomy, bladder function, urinary reflex, impact of bladder irritants and hydration on bladder function  NM re-ed: 8 min  Worked on isolation of PFM contraction (without accessory muscles use) inverse to diaphragmatic breathing in sitting with external palpation of PFMs       Theract: 10 min  Pt ed on bladder irritants and impact on bladder function            HEP: Access Code: [email or web address]  URL: [email or web address]  Date: 08/08/2024  Prepared by: Zina Porod  Exercises  - Seated Piriformis Stretch with Trunk Bend  - 1 x daily - 7 x weekly - 1 sets - 3 reps - 30 sec hold  - Seated Hamstring Stretch  - 1 x daily - 7 x weekly - 1 sets - 3 reps - 30 sec hold  - Supine Bridge with Mini Swiss Ball Between Knees  - 1 x daily - 7 x weekly - 1 sets - 10 reps  - Bridge with Hip Abduction and Resistance  - 1 x daily - 7 x weekly - 10 sets - 10 reps  - Sit to Stand with Pelvic Floor Contraction  - 1 x daily - 7 x weekly - 1 sets - 10 reps  - Clamshell  - 1 x daily - 7 x weekly - 1 sets - 10 reps  -  Sidelying Reverse Clamshell  - 1 x daily - 7 x weekly - 1 sets - 10 reps      Charges: 1 TA,1 TE, 1 NM       Total Timed Treatment: 40 min  Total Treatment Time: 40 min

## 2024-08-28 ENCOUNTER — OFFICE VISIT (OUTPATIENT)
Dept: PHYSICAL THERAPY | Age: 74
End: 2024-08-28
Attending: INTERNAL MEDICINE
Payer: MEDICARE

## 2024-08-28 DIAGNOSIS — R32 URINARY INCONTINENCE, UNSPECIFIED TYPE: Primary | ICD-10-CM

## 2024-08-28 PROCEDURE — 97110 THERAPEUTIC EXERCISES: CPT

## 2024-08-28 PROCEDURE — 97530 THERAPEUTIC ACTIVITIES: CPT

## 2024-08-28 NOTE — PROGRESS NOTES
Diagnosis:    Urinary incontinence, unspecified type (R32)   Referring Provider: Bryanna Espinosa  Date of Evaluation:    7/30/2024    Precautions:  None Next MD visit:   none scheduled  Date of Surgery: n/a   Insurance Primary/Secondary: YAEL BEATTY / N/A     # Auth Visits: latesha BEATTY thru 10/28/2024            Subjective: Pt reports no leakage this week. Doing exercises, some L knee issues but not necessarily from exercises.     Pain: 0/10      Objective: see chart below      Assessment: Pt's leakage is improved this week, pt had no leakage. Some minimal ant L knee pain with 6\" forward step-ups after SL shuttle, but knee pain was better with lateral step-ups. Pt continues to be motivated and completing HEP. Pt will continue to benefit from skilled PT intervention.       Goals: (to be met in 10 visits)  Patient instructed on bladder irritants, increased water intake to 64 ounces /day (GOAL MET)     Patient to report urinary frequency to every 2-4 hours to allow for independent ADLs (in progress)  Patient reports a reduction in DASHA from 3+x/ day to 0-1x/ day resulting in no need for pad use. (in progress - no leakage last week)    Patient is able to perform Levator Ani contraction inverse to diaphragmatic breathing to allow for pelvic brace with ADLs without valsalva. (in progress)     Patient reports change in [place] stool to #4 with daily bowel moment without straining for improved bowel function.  (in progress)     Patient understands importance of performing HEP to prevent reoccurrence of symptoms. (GOAL MET)    Pt with B hip strength 5/5 in order to decreased the demand on pelvic floor muscles with functional squat and lifting a load from ground level.  (in progress)    Plan: direct and indirect PFM strengthening, hip strengthening: bridge pilates ring hip abd with kegel, lunge with kegel, SL balance  Date: 8/14/24               TX#: 3 Date: 8/21/24               TX#: 4 Date:  8/28/24               TX#: 5 Date:    Tx#: 6/   Therex: 38 min  Reviewed HEP  Recumbent bike L3 x5 min  Shuttle 7c DL x12, SL R/L x12  Calf stretch L2 x1 min  SL balance on airex R/L 30\" x3 ea  Bridge with hip add ball squeeze x10  Bridge with hip abd red TB x10  Supine OH reach 4lb med ball for abdominal activation x10 Therex: 22 min  Reviewed HEP  Bridge with hip add ball squeeze + kegel x10  Bridge with hip abd ball squeeze + kegel x10  Sit<>stand with 10lb db OH reach with exhale + kegel x10  Shuttle +exhale+kegel DL 8c x10, SL R/L 7c x10 Therex: 30 min  Reviewed HEP  Stretch strap: prone quad stretch R/L x30\", hamstrings R/L x30\", hip abd x30\", hip add x30\"  Calf stretch L 2 x1 min  Shuttle +exhale+kegel DL 7c x10, SL R/L 7c x10  Step-ups 6\" with kegel, lateral x6 ea     NM re-ed: 8 min  Worked on isolation of PFM contraction (without accessory muscles use) inverse to diaphragmatic breathing in sitting with external palpation of PFMs Theract: 10 min  Pt ed on anatomy and impact foot/ankle and of PFM on shock absorption with running and exercises with higher impact     Theract: 10 min  Pt ed on bladder irritants and impact on bladder function           HEP: Access Code: [email or web address]  URL: [email or web address]  Exercises  - Seated Piriformis Stretch with Trunk Bend  - 1 x daily - 7 x weekly - 1 sets - 3 reps - 30 sec hold  - Seated Hamstring Stretch  - 1 x daily - 7 x weekly - 1 sets - 3 reps - 30 sec hold  - Supine Bridge with Mini Swiss Ball Between Knees  - 1 x daily - 7 x weekly - 1 sets - 10 reps  - Bridge with Hip Abduction and Resistance  - 1 x daily - 7 x weekly - 10 sets - 10 reps  - Sit to Stand with Pelvic Floor Contraction  - 1 x daily - 7 x weekly - 1 sets - 10 reps  - Clamshell  - 1 x daily - 7 x weekly - 1 sets - 10 reps  - Sidelying Reverse Clamshell  - 1 x daily - 7 x weekly - 1 sets - 10 reps  - Lateral Step Up  - 1 x daily - 3 x weekly - 1-2 sets - 10 reps      Charges: 2 TE, 1 TA       Total Timed Treatment: 40  min  Total Treatment Time: 40 min

## 2024-09-04 ENCOUNTER — OFFICE VISIT (OUTPATIENT)
Dept: PHYSICAL THERAPY | Age: 74
End: 2024-09-04
Attending: INTERNAL MEDICINE
Payer: MEDICARE

## 2024-09-04 DIAGNOSIS — R32 URINARY INCONTINENCE, UNSPECIFIED TYPE: Primary | ICD-10-CM

## 2024-09-04 PROCEDURE — 97110 THERAPEUTIC EXERCISES: CPT

## 2024-09-04 NOTE — PROGRESS NOTES
Diagnosis:    Urinary incontinence, unspecified type (R32)   Referring Provider: Bryanna Espinosa  Date of Evaluation:    7/30/2024    Precautions:  None Next MD visit:   none scheduled  Date of Surgery: n/a   Insurance Primary/Secondary: AETJANAK BEATTY / N/A     # Auth Visits: latesha BEATTY thru 10/28/2024            Subjective: Pt reports a little bit of leakage 1 day, otherwise none. Knee pain has been on and off.     Pain: 0/10      Objective: see chart below      Assessment: Pt's leakage is still improved overall this week, doing well with progression of exercises. Requires occasional verbal cues for optimal breathing sequencing for intrabdominal pressure management. Pt will continue to benefit from skilled PT intervention.       Goals: (to be met in 10 visits)  Patient instructed on bladder irritants, increased water intake to 64 ounces /day (GOAL MET)     Patient to report urinary frequency to every 2-4 hours to allow for independent ADLs (in progress)  Patient reports a reduction in DASHA from 3+x/ day to 0-1x/ day resulting in no need for pad use. (in progress - 1x leakage past 2 weeks)    Patient is able to perform Levator Ani contraction inverse to diaphragmatic breathing to allow for pelvic brace with ADLs without valsalva. (in progress)     Patient reports change in [place] stool to #4 with daily bowel moment without straining for improved bowel function.  (in progress)     Patient understands importance of performing HEP to prevent reoccurrence of symptoms. (GOAL MET)    Pt with B hip strength 5/5 in order to decreased the demand on pelvic floor muscles with functional squat and lifting a load from ground level.  (in progress)    Plan: direct and indirect PFM strengthening, hip strengthening: standing RB hip abd, ext  Date: 8/21/24               TX#: 4 Date:  8/28/24               TX#: 5 Date: 9/4/24  Tx#: 6   Therex: 22 min  Reviewed HEP  Bridge with hip add ball squeeze + kegel x10  Bridge with hip abd ball  squeeze + kegel x10  Sit<>stand with 10lb db OH reach with exhale + kegel x10  Shuttle +exhale+kegel DL 8c x10, SL R/L 7c x10 Therex: 30 min  Reviewed HEP  Stretch strap: prone quad stretch R/L x30\", hamstrings R/L x30\", hip abd x30\", hip add x30\"  Calf stretch L 2 x1 min  Shuttle +exhale+kegel DL 7c x10, SL R/L 7c x10  Step-ups 6\" with kegel, lateral x6 ea Therex: 43 min  Green stretch strap, prone quads, supine hamstring, ITB, hip add, x30 sec ea on R/L  Pilates ring bridge with 1/4 kegel + exhale, hip add x10, hip abd x10  SL bridge x10  Fig 4 bridge x10  Clams R/L x10, with UE ball press for TA activtation x10  Reverse clams R/L x10  Sit>stand with exhale + kegel x5  Squat to target with breath sequence x5, with 1/4 kegel x5 (to high target, standard chair height target)  Squat with 10lb db chest height with 1/4 kegel + exhale x5  Lunge with 1/4 kegel + exhale R/L x5 ea   NM re-ed: 8 min  Worked on isolation of PFM contraction (without accessory muscles use) inverse to diaphragmatic breathing in sitting with external palpation of PFMs Theract: 10 min  Pt ed on anatomy and impact foot/ankle and of PFM on shock absorption with running and exercises with higher impact    Theract: 10 min  Pt ed on bladder irritants and impact on bladder function          HEP: Access Code: [email or web address]  URL: [email or web address]  Date: 09/04/2024  Prepared by: Zina Porod  Exercises  - Seated Piriformis Stretch with Trunk Bend  - 1 x daily - 7 x weekly - 1 sets - 3 reps - 30 sec hold  - Seated Hamstring Stretch  - 1 x daily - 7 x weekly - 1 sets - 3 reps - 30 sec hold  - Supine Bridge with Mini Swiss Ball Between Knees  - 1 x daily - 7 x weekly - 1 sets - 10 reps  - Bridge with Hip Abduction and Resistance  - 1 x daily - 7 x weekly - 1 sets - 10 reps  - Single Leg Bridge  - 1 x daily - 7 x weekly - 3 sets - 10 reps  - Clamshell  - 1 x daily - 7 x weekly - 1 sets - 10 reps  - Sidelying Reverse Clamshell  - 1 x daily - 7  x weekly - 1 sets - 10 reps  - Lateral Step Up  - 1 x daily - 3 x weekly - 1-2 sets - 10 reps  - Squat with Chair Touch  - 1 x daily - 3 x weekly - 1 sets - 10 reps  - Static Lunge  - 1 x daily - 3 x weekly - 1 sets - 10 reps  - Prone Quad Stretch with Towel Roll and Strap  - 1 x daily - 7 x weekly - 1 sets - 3 reps - 30 sec hold  - Prone Quadriceps Stretch with Strap  - 1 x daily - 7 x weekly - 1 sets - 3 reps - 30 sec hold  - Supine Hamstring Stretch with Strap  - 1 x daily - 7 x weekly - 1 sets - 3 reps - 30 sec hold  - Supine ITB Stretch with Strap  - 1 x daily - 7 x weekly - 1 sets - 3 reps - 30 sec hold  - Hip Adductors and Hamstring Stretch with Strap  - 1 x daily - 7 x weekly - 1 sets - 3 reps - 30 sec hold      Charges: 3 TE       Total Timed Treatment: 43 min  Total Treatment Time: 45 min

## 2024-09-11 ENCOUNTER — OFFICE VISIT (OUTPATIENT)
Dept: PHYSICAL THERAPY | Age: 74
End: 2024-09-11
Attending: INTERNAL MEDICINE
Payer: MEDICARE

## 2024-09-11 DIAGNOSIS — R32 URINARY INCONTINENCE, UNSPECIFIED TYPE: Primary | ICD-10-CM

## 2024-09-11 PROCEDURE — 97110 THERAPEUTIC EXERCISES: CPT

## 2024-09-11 NOTE — PROGRESS NOTES
Discharge Summary  Pt has attended 7 visits in Physical Therapy.     Diagnosis:    Urinary incontinence, unspecified type (R32)   Referring Provider: Bryanna Espinosa  Date of Evaluation:    7/30/2024    Precautions:  None Next MD visit:   none scheduled  Date of Surgery: n/a   Insurance Primary/Secondary: AETNA MCR / N/A     # Auth Visits: [email or web address] thru 10/28/2024            Subjective: Pt reports no leaking this week, feels like hips are getting stronger. Rode bike, walked, ran this week. Feels like she is ready to keep working on things on her own now.     Pain: 0/10      Objective: see chart below      Assessment: Pt has met all PT goals at this time. Pt's leakage improvements have maintained over the past week, pt has had no episodes of DSAHA over the past 2 weeks, while maintaining regular activity levels. Pt is appropriate for and agreeable to discharge to HEP and indep management of symptoms at this time.      Goals: (to be met in 10 visits)  Patient instructed on bladder irritants, increased water intake to 64 ounces /day (GOAL MET)     Patient to report urinary frequency to every 2-4 hours to allow for independent ADLs (GOAL MET)  Patient reports a reduction in DASHA from 3+x/ day to 0-1x/ day resulting in no need for pad use. (GOAL MET)    Patient is able to perform Levator Ani contraction inverse to diaphragmatic breathing to allow for pelvic brace with ADLs without valsalva. (GOAL MET)     Patient reports change in [place] stool to #4 with daily bowel moment without straining for improved bowel function.  (GOAL MET)     Patient understands importance of performing HEP to prevent reoccurrence of symptoms. (GOAL MET)    Pt with B hip strength 5/5 in order to decreased the demand on pelvic floor muscles with functional squat and lifting a load from ground level.  (GOAL MET)    Plan: discharge  Date:  8/28/24               TX#: 5 Date: 9/4/24  Tx#: 6 Date: 9/11/24  Tx#: 7   Therex: 30 min  Reviewed  HEP  Stretch strap: prone quad stretch R/L x30\", hamstrings R/L x30\", hip abd x30\", hip add x30\"  Calf stretch L 2 x1 min  Shuttle +exhale+kegel DL 7c x10, SL R/L 7c x10  Step-ups 6\" with kegel, lateral x6 ea Therex: 43 min  Green stretch strap, prone quads, supine hamstring, ITB, hip add, x30 sec ea on R/L  Pilates ring bridge with 1/4 kegel + exhale, hip add x10, hip abd x10  SL bridge x10  Fig 4 bridge x10  Clams R/L x10, with UE ball press for TA activtation x10  Reverse clams R/L x10  Sit>stand with exhale + kegel x5  Squat to target with breath sequence x5, with 1/4 kegel x5 (to high target, standard chair height target)  Squat with 10lb db chest height with 1/4 kegel + exhale x5  Lunge with 1/4 kegel + exhale R/L x5 ea Therex: 38 min  Green stretch strap, prone quads, supine hamstring, ITB, hip add, x30 sec ea on R/L  Pilates ring bridge with 1/4 kegel + exhale, hip add x10, hip abd x10  SL bridge x10  Supine fig 4 stretch x10  Clams yellow TB R/L x10  Reverse clams yellow TB R/L x10  Squat with 15lb db chest height with 1/4 kegel + exhale x5  Lunge with 1/4 kegel + exhale R/L x10ea   Theract: 10 min  Pt ed on anatomy and impact foot/ankle and of PFM on shock absorption with running and exercises with higher impact               HEP: Access Code: [email or web address]  URL: [email or web address]  Date: 09/04/2024  Prepared by: Zina Wong  Exercises  - Seated Piriformis Stretch with Trunk Bend  - 1 x daily - 7 x weekly - 1 sets - 3 reps - 30 sec hold  - Seated Hamstring Stretch  - 1 x daily - 7 x weekly - 1 sets - 3 reps - 30 sec hold  - Supine Bridge with Mini Swiss Ball Between Knees  - 1 x daily - 7 x weekly - 1 sets - 10 reps  - Bridge with Hip Abduction and Resistance  - 1 x daily - 7 x weekly - 1 sets - 10 reps  - Single Leg Bridge  - 1 x daily - 7 x weekly - 3 sets - 10 reps  - Clamshell  - 1 x daily - 7 x weekly - 1 sets - 10 reps  - Sidelying Reverse Clamshell  - 1 x daily - 7 x weekly - 1 sets  - 10 reps  - Lateral Step Up  - 1 x daily - 3 x weekly - 1-2 sets - 10 reps  - Squat with Chair Touch  - 1 x daily - 3 x weekly - 1 sets - 10 reps  - Static Lunge  - 1 x daily - 3 x weekly - 1 sets - 10 reps  - Prone Quad Stretch with Towel Roll and Strap  - 1 x daily - 7 x weekly - 1 sets - 3 reps - 30 sec hold  - Prone Quadriceps Stretch with Strap  - 1 x daily - 7 x weekly - 1 sets - 3 reps - 30 sec hold  - Supine Hamstring Stretch with Strap  - 1 x daily - 7 x weekly - 1 sets - 3 reps - 30 sec hold  - Supine ITB Stretch with Strap  - 1 x daily - 7 x weekly - 1 sets - 3 reps - 30 sec hold  - Hip Adductors and Hamstring Stretch with Strap  - 1 x daily - 7 x weekly - 1 sets - 3 reps - 30 sec hold    Charges: 3 TE       Total Timed Treatment: 38 min  Total Treatment Time: 38 min      Plan: discharge      Thank you for your referral. If you have any questions, please contact me at Dept: [phone].    Sincerely,  Electronically signed by therapist: Zina Wong PT     Physician's certification required:  No

## 2024-09-18 ENCOUNTER — APPOINTMENT (OUTPATIENT)
Dept: PHYSICAL THERAPY | Age: 74
End: 2024-09-18
Attending: INTERNAL MEDICINE
Payer: MEDICARE

## 2024-09-25 ENCOUNTER — APPOINTMENT (OUTPATIENT)
Dept: PHYSICAL THERAPY | Age: 74
End: 2024-09-25
Attending: INTERNAL MEDICINE
Payer: MEDICARE

## 2024-09-30 ENCOUNTER — OFFICE VISIT (OUTPATIENT)
Dept: DERMATOLOGY CLINIC | Facility: CLINIC | Age: 74
End: 2024-09-30
Payer: MEDICARE

## 2024-09-30 DIAGNOSIS — L82.1 SK (SEBORRHEIC KERATOSIS): Primary | ICD-10-CM

## 2024-09-30 DIAGNOSIS — L81.4 LENTIGO: ICD-10-CM

## 2024-09-30 DIAGNOSIS — D22.9 MULTIPLE NEVI: ICD-10-CM

## 2024-09-30 DIAGNOSIS — L81.8 IDIOPATHIC GUTTATE HYPOMELANOSIS: ICD-10-CM

## 2024-09-30 DIAGNOSIS — D23.9 BENIGN NEOPLASM OF SKIN, UNSPECIFIED LOCATION: ICD-10-CM

## 2024-09-30 PROCEDURE — 99213 OFFICE O/P EST LOW 20 MIN: CPT | Performed by: DERMATOLOGY

## 2024-10-12 NOTE — PROGRESS NOTES
April Brown is a 73 year old female.  HPI:     CC:    Chief Complaint   Patient presents with    Full Skin Exam     LOV . Pt present for full body exam. Hx of Sks, denies any hx of skin CA. Pt present with a lesion of concern under her R eye, spot is dark and raised. Pt states it looks like it has grown x 5 years.          Allergies:  Adapalene    HISTORY:    Past Medical History:    Allergic rhinitis    Breast cancer (HCC)    Chemotherapy, left lumpectomy--sees Dr Gregory    Diverticulosis    cscopy  Dr. Clarke-divertic & int hem    Environmental allergies    Essential hypertension    Exposure to medical diagnostic radiation    Frequent headaches    MRI brain     Hemoptysis    Dr. Tanner in -flexible layngoscopy was neg. CXR -neg.    Hemoptysis    flex laryngoscopy 18 Dr. Tanenr--an irritation in the nasapharynx.     High blood pressure    Hypertension, essential    Incontinence    bladder    Internal hemorrhoids    Colonoscopy    Microscopic hematuria    Osteoarthritis    Personal history of antineoplastic chemotherapy    Right knee DJD    17-xray R knee--narrowing of medial joint space and small joint effusion. Saw Dr Waters.    Visual impairment    Vitamin D deficiency      Past Surgical History:   Procedure Laterality Date    Cataract      Chemotherapy      Colonoscopy  2011    Colonoscopy  2019    D & c      Hysteroscopy/D&C    Dilation/curettage,diagnostic      Elbow surgery Left 2020    ORIF L olecranon fracture 20 -Dr Waters.     Electrocardiogram, complete  04/10/2012    Scanned to media tab 04-    Hysteroscopy      Hysteroscopy/D&C    Lumpectomy left        [id number]    Radiation left        Family History   Problem Relation Age of Onset    Heart Disorder Mother          age 59 (cause of death)    Alcohol and Other Disorders Associated Father         Alcoholism    Cancer Father         Throat    Depression Father          Also hypertension    Dementia Brother 77        Alzheimer’s    Cancer Brother         Alzheimer’s    Cancer Brother 61        Multiplyloma    Other (Other) Brother         Sleep apnea    Breast Cancer Self         46      Social History     Socioeconomic History    Marital status:    Tobacco Use    Smoking status: Never     Passive exposure: Past (father was a smoker)    Smokeless tobacco: Never   Vaping Use    Vaping status: Never Used   Substance and Sexual Activity    Alcohol use: Yes     Alcohol/week: 1.0 standard drink of alcohol     Types: 1 Glasses of wine per week     Comment: wine, rarely     Drug use: No   Other Topics Concern    Caffeine Concern Yes     Comment: coffee, 2 cups daily (No per NG ECD)    Grew up on a farm No    History of tanning No    Outdoor occupation No    Breast feeding No    Reaction to local anesthetic No    Pt has a pacemaker No    Pt has a defibrillator No        Current Outpatient Medications   Medication Sig Dispense Refill    TRIAMTERENE-HCTZ 37.5-25 MG Oral Tab TAKE 1 TABLET BY MOUTH DAILY 90 tablet 3    Multiple Vitamin (MULTI-VITAMIN DAILY) Oral Tab Take 1 tablet by mouth daily.      Omega-3 Fatty Acids (FISH OIL OR) Take 1 capsule by mouth daily.      Calcium Carbonate-Vitamin D (CALCIUM + D OR) Take 1 tablet by mouth daily.       Allergies:   Allergies   Allergen Reactions    Adapalene OTHER (SEE COMMENTS)     \"Eyes blew up\" after adapalene cream from derm in 2022       Past Medical History:    Allergic rhinitis    Breast cancer (HCC)    Chemotherapy, left lumpectomy--sees Dr Gregory    Diverticulosis    cscopy 12/11 Dr. Clarke-divertic & int hem    Environmental allergies    Essential hypertension    Exposure to medical diagnostic radiation    Frequent headaches    MRI brain 2002    Hemoptysis    Dr. Tanner in 4/12-flexible layngoscopy was neg. CXR 4/12-neg.    Hemoptysis    flex laryngoscopy 11/1/18 Dr. Tanner--an irritation in the nasapharynx.     High blood pressure     Hypertension, essential    Incontinence    bladder    Internal hemorrhoids    Colonoscopy    Microscopic hematuria    Osteoarthritis    Personal history of antineoplastic chemotherapy    Right knee DJD    17-xray R knee--narrowing of medial joint space and small joint effusion. Saw Dr Waters.    Visual impairment    Vitamin D deficiency     Past Surgical History:   Procedure Laterality Date    Cataract      Chemotherapy      Colonoscopy  2011    Colonoscopy  2019    D & c  1998    Hysteroscopy/D&C    Dilation/curettage,diagnostic      Elbow surgery Left 2020    ORIF L olecranon fracture 20 -Dr Waters.     Electrocardiogram, complete  04/10/2012    Scanned to media tab 04-    Hysteroscopy      Hysteroscopy/D&C    Lumpectomy left        [id number]    Radiation left       Social History     Socioeconomic History    Marital status:      Spouse name: Not on file    Number of children: Not on file    Years of education: Not on file    Highest education level: Not on file   Occupational History    Not on file   Tobacco Use    Smoking status: Never     Passive exposure: Past (father was a smoker)    Smokeless tobacco: Never   Vaping Use    Vaping status: Never Used   Substance and Sexual Activity    Alcohol use: Yes     Alcohol/week: 1.0 standard drink of alcohol     Types: 1 Glasses of wine per week     Comment: wine, rarely     Drug use: No    Sexual activity: Not on file   Other Topics Concern     Service Not Asked    Blood Transfusions Not Asked    Caffeine Concern Yes     Comment: coffee, 2 cups daily (No per NG ECD)    Occupational Exposure Not Asked    Hobby Hazards Not Asked    Sleep Concern Not Asked    Stress Concern Not Asked    Weight Concern Not Asked    Special Diet Not Asked    Back Care Not Asked    Exercise Not Asked    Bike Helmet Not Asked    Seat Belt Not Asked    Self-Exams Not Asked    Grew up on a farm No    History of tanning No     Outdoor occupation No    Breast feeding No    Reaction to local anesthetic No    Pt has a pacemaker No    Pt has a defibrillator No   Social History Narrative    Not on file     Social Drivers of Health     Financial Resource Strain: Not on file   Food Insecurity: Not on file   Transportation Needs: Not on file   Physical Activity: Not on file   Stress: Not on file   Social Connections: Not on file   Housing Stability: Not on file     Family History   Problem Relation Age of Onset    Heart Disorder Mother          age 59 (cause of death)    Alcohol and Other Disorders Associated Father         Alcoholism    Cancer Father         Throat    Depression Father         Also hypertension    Dementia Brother 77        Alzheimer’s    Cancer Brother         Alzheimer’s    Cancer Brother 61        Multiplyloma    Other (Other) Brother         Sleep apnea    Breast Cancer Self         46       There were no vitals filed for this visit.    HPI:  Chief Complaint   Patient presents with    Full Skin Exam     LOV . Pt present for full body exam. Hx of Sks, denies any hx of skin CA. Pt present with a lesion of concern under her R eye, spot is dark and raised. Pt states it looks like it has grown x 5 years.        Patient for full skin exam.  Spends most of the year in [place] lots of sun exposure does use sunscreen.    Also complains of hair loss nail changes no particular skin lesions of concern.    No personal  or family history of skin cancer      Patient presents with concerns above.    Patient has been in their usual state of health.     Past notes/ records and appropriate/relevant lab results including pathology and past body maps reviewed. Including outside notes/ PCP notes as appropriate. Updated and new information noted in current visit.     ROS:  Denies other relevant systemic complaints.      History, medications, allergies reviewed as noted.       Physical Examination:     Well-developed well-nourished  patient alert oriented in no acute distress.  Exam performed, including scalp, head, neck, face,nails, hair, external eyes, including conjunctival mucosa, eyelids, lips external ears , arms, digits,palms.     Multiple light to medium brown, well marginated, uniformly pigmented, macules and papules 6 mm and less scattered on exam. pigmented lesions examined with dermoscopy benign-appearing patterns.     Waxy tannish keratotic papules scattered, cherry-red vascular papules scattered.    See map today's date for lesions noted .  See assessment and plan below for specific lesions.    Otherwise remarkable for lesions as noted on map.    See A/P  below for additional information:    Assessment / plan:    No orders of the defined types were placed in this encounter.      Meds & Refills for this Visit:  Requested Prescriptions      No prescriptions requested or ordered in this encounter         Encounter Diagnoses   Name Primary?    SK (seborrheic keratosis) Yes    Lentigo     Multiple nevi     Benign neoplasm of skin, unspecified location     Idiopathic guttate hypomelanosis        Recent insect bite allergy.  Consider prescription topicals.  Will let us know if additional medications needed resolving currently.    Patient scattered nevi lentigines keratoses reassurance.    Areas of guttate hypomelanosis over arms legs reassurance.    Thumbnails with longitudinal ridging    Pathophysiology discussed.  Hand, nailcare routine discussed.  Biotin supplementation 1500 mcg at minimum daily for 3-4 months to assess response.  Emollients with alphahydroxy acid.  Use of clear nail polish removed in frequently to help splint nails discussed.  No evidence of fungus.  Recurrent nature reviewed.  TSH if appropriate repeat in 3-4 months.    Patient complains of hair loss   Decreased density continue monitoring, supportive care    No other susupicious lesions on todays  exam.    Please refer to map for specific lesions.  See additional  diagnoses.  Pros cons of various therapies, risks benefits discussed.Pathophysiology discussed with patient.  Therapeutic options reviewed.  See  Medications in grid.  Instructions reviewed at length.    Benign nevi, seborrheic  keratoses, cherry angiomas:  Reassurance regarding other benign skin lesions.Signs and symptoms of skin cancer, ABCDE's of melanoma discussed with patient. Sunscreen use, sun protection, self exams reviewed.  Followup as noted RTC ---routine checkup    6 mos -one year or p.r.n.    Encounter Times   Including precharting, reviewing chart, prior notes obtaining history: 10 minutes, medical exam :10 minutes, notes on body map, plan, counseling 10minutes My total time spent caring for the patient on the day of the encounter: 30 minutes     The patient indicates understanding of these issues and agrees to the plan.  The patient is asked to return as noted in follow-up/ above.    This note was generated using Dragon voice recognition software.  Please contact me regarding any confusion resulting from errors in recognition..  Note to patient and family: The 21st Century Cures Act makes medical notes like these available to patients. However, be advised this is a medical document. It is intended as [id number] communication and monitoring of a patient's care needs. It is written in medical language and may contain abbreviations or verbiage that are unfamiliar. It may appear blunt or direct. Medical documents are intended to carry relevant information, facts as evident and the clinical opinion of the practitioner.

## 2024-11-07 ENCOUNTER — TELEPHONE (OUTPATIENT)
Dept: INTERNAL MEDICINE CLINIC | Facility: CLINIC | Age: 74
End: 2024-11-07

## 2024-11-07 NOTE — TELEPHONE ENCOUNTER
Patient called with her new physician information when she is in [place]:      DR. ENRIQUE OZUNA  [address] [facility]  [address] [place] [address]. [phone]  FAX#[phone]

## 2024-11-20 ENCOUNTER — TELEPHONE (OUTPATIENT)
Dept: INTERNAL MEDICINE CLINIC | Facility: CLINIC | Age: 74
End: 2024-11-20

## 2024-11-20 ENCOUNTER — LAB ENCOUNTER (OUTPATIENT)
Dept: LAB | Facility: HOSPITAL | Age: 74
End: 2024-11-20
Attending: INTERNAL MEDICINE
Payer: MEDICARE

## 2024-11-20 DIAGNOSIS — Z00.00 MEDICARE ANNUAL WELLNESS VISIT, SUBSEQUENT: ICD-10-CM

## 2024-11-20 LAB
ALBUMIN SERPL-MCNC: 4.2 G/DL (ref 3.2–4.8)
ALBUMIN/GLOB SERPL: 1.4 {RATIO} (ref 1–2)
ALP LIVER SERPL-CCNC: 81 U/L
ALT SERPL-CCNC: 14 U/L
ANION GAP SERPL CALC-SCNC: 5 MMOL/L (ref 0–18)
AST SERPL-CCNC: 21 U/L (ref ?–34)
BASOPHILS # BLD AUTO: 0.02 X10(3) UL (ref 0–0.2)
BASOPHILS NFR BLD AUTO: 0.5 %
BILIRUB SERPL-MCNC: 0.8 MG/DL (ref 0.2–1.1)
BILIRUB UR QL: NEGATIVE
BUN BLD-MCNC: 17 MG/DL (ref 9–23)
BUN/CREAT SERPL: 22.1 (ref 10–20)
CALCIUM BLD-MCNC: 10.1 MG/DL (ref 8.7–10.4)
CHLORIDE SERPL-SCNC: 104 MMOL/L (ref 98–112)
CHOLEST SERPL-MCNC: 185 MG/DL (ref ?–200)
CLARITY UR: CLEAR
CO2 SERPL-SCNC: 31 MMOL/L (ref 21–32)
COLOR UR: COLORLESS
CREAT BLD-MCNC: 0.77 MG/DL
DEPRECATED RDW RBC AUTO: 46 FL (ref 35.1–46.3)
EGFRCR SERPLBLD CKD-EPI 2021: 82 ML/MIN/1.73M2 (ref 60–?)
EOSINOPHIL # BLD AUTO: 0.28 X10(3) UL (ref 0–0.7)
EOSINOPHIL NFR BLD AUTO: 6.6 %
ERYTHROCYTE [DISTWIDTH] IN BLOOD BY AUTOMATED COUNT: 12.7 % (ref 11–15)
FASTING PATIENT LIPID ANSWER: YES
FASTING STATUS PATIENT QL REPORTED: YES
GLOBULIN PLAS-MCNC: 2.9 G/DL (ref 2–3.5)
GLUCOSE BLD-MCNC: 81 MG/DL (ref 70–99)
GLUCOSE UR-MCNC: NORMAL MG/DL
HCT VFR BLD AUTO: 42.1 %
HDLC SERPL-MCNC: 56 MG/DL (ref 40–59)
HGB BLD-MCNC: 14 G/DL
HGB UR QL STRIP.AUTO: NEGATIVE
IMM GRANULOCYTES # BLD AUTO: 0 X10(3) UL (ref 0–1)
IMM GRANULOCYTES NFR BLD: 0 %
KETONES UR-MCNC: NEGATIVE MG/DL
LDLC SERPL CALC-MCNC: 117 MG/DL (ref ?–100)
LEUKOCYTE ESTERASE UR QL STRIP.AUTO: NEGATIVE
LYMPHOCYTES # BLD AUTO: 1.3 X10(3) UL (ref 1–4)
LYMPHOCYTES NFR BLD AUTO: 30.4 %
MCH RBC QN AUTO: 32.9 PG (ref 26–34)
MCHC RBC AUTO-ENTMCNC: 33.3 G/DL (ref 31–37)
MCV RBC AUTO: 98.8 FL
MONOCYTES # BLD AUTO: 0.34 X10(3) UL (ref 0.1–1)
MONOCYTES NFR BLD AUTO: 8 %
NEUTROPHILS # BLD AUTO: 2.33 X10 (3) UL (ref 1.5–7.7)
NEUTROPHILS # BLD AUTO: 2.33 X10(3) UL (ref 1.5–7.7)
NEUTROPHILS NFR BLD AUTO: 54.5 %
NITRITE UR QL STRIP.AUTO: NEGATIVE
NONHDLC SERPL-MCNC: 129 MG/DL (ref ?–130)
OSMOLALITY SERPL CALC.SUM OF ELEC: 291 MOSM/KG (ref 275–295)
PH UR: 6.5 [PH] (ref 5–8)
PLATELET # BLD AUTO: 241 10(3)UL (ref 150–450)
POTASSIUM SERPL-SCNC: 3.7 MMOL/L (ref 3.5–5.1)
PROT SERPL-MCNC: 7.1 G/DL (ref 5.7–8.2)
PROT UR-MCNC: NEGATIVE MG/DL
RBC # BLD AUTO: 4.26 X10(6)UL
SODIUM SERPL-SCNC: 140 MMOL/L (ref 136–145)
SP GR UR STRIP: 1.01 (ref 1–1.03)
TRIGL SERPL-MCNC: 62 MG/DL (ref 30–149)
TSI SER-ACNC: 2.52 UIU/ML (ref 0.55–4.78)
UROBILINOGEN UR STRIP-ACNC: NORMAL
VLDLC SERPL CALC-MCNC: 11 MG/DL (ref 0–30)
WBC # BLD AUTO: 4.3 X10(3) UL (ref 4–11)

## 2024-11-20 PROCEDURE — 36415 COLL VENOUS BLD VENIPUNCTURE: CPT

## 2024-11-20 PROCEDURE — 84443 ASSAY THYROID STIM HORMONE: CPT

## 2024-11-20 PROCEDURE — 80053 COMPREHEN METABOLIC PANEL: CPT

## 2024-11-20 PROCEDURE — 81003 URINALYSIS AUTO W/O SCOPE: CPT

## 2024-11-20 PROCEDURE — 85025 COMPLETE CBC W/AUTO DIFF WBC: CPT

## 2024-11-20 PROCEDURE — 80061 LIPID PANEL: CPT

## 2024-11-25 ENCOUNTER — TELEPHONE (OUTPATIENT)
Dept: INTERNAL MEDICINE CLINIC | Facility: CLINIC | Age: 74
End: 2024-11-25

## 2024-11-25 NOTE — TELEPHONE ENCOUNTER
Patient came into the office and requested a CLAIRE be sent to her doctor in [place].    Patient filled out the CLAIRE form.  Form was faxed to the medical records on 11/25/24

## 2024-12-21 NOTE — TELEPHONE ENCOUNTER
Per [facility] records, the test is still in process at this time. Message relayed to patient who verbalized understanding. Informed her that once the results becomes available she will receive a call. Pt expressed appreciation. Nothing further at this time. negative...

## 2025-04-14 RX ORDER — TRIAMTERENE AND HYDROCHLOROTHIAZIDE 37.5; 25 MG/1; MG/1
1 TABLET ORAL DAILY
Qty: 90 TABLET | Refills: 0 | Status: SHIPPED | OUTPATIENT
Start: 2025-04-14

## 2025-04-14 NOTE — TELEPHONE ENCOUNTER
Has annual scheduled in may     Refill request is for a maintenance medication and has met the criteria specified in the Ambulatory Medication Refill Standing Order for eligibility, visits, laboratory, alerts and was sent to the requested pharmacy.    Requested Prescriptions     Signed Prescriptions Disp Refills    Triamterene-HCTZ 37.5-25 MG Oral Tab 90 tablet 0     Sig: TAKE 1 TABLET BY MOUTH DAILY     Authorizing Provider: ZENY LEYVA     Ordering User: MARILIN MURO

## 2025-05-06 ENCOUNTER — OFFICE VISIT (OUTPATIENT)
Dept: INTERNAL MEDICINE CLINIC | Facility: CLINIC | Age: 75
End: 2025-05-06

## 2025-05-06 VITALS
OXYGEN SATURATION: 99 % | DIASTOLIC BLOOD PRESSURE: 72 MMHG | HEART RATE: 78 BPM | TEMPERATURE: 98 F | WEIGHT: 157.19 LBS | SYSTOLIC BLOOD PRESSURE: 128 MMHG | HEIGHT: 65 IN | BODY MASS INDEX: 26.19 KG/M2

## 2025-05-06 DIAGNOSIS — Z00.00 MEDICARE ANNUAL WELLNESS VISIT, SUBSEQUENT: Primary | ICD-10-CM

## 2025-05-06 DIAGNOSIS — Z12.31 ENCOUNTER FOR SCREENING MAMMOGRAM FOR MALIGNANT NEOPLASM OF BREAST: ICD-10-CM

## 2025-05-06 DIAGNOSIS — Z78.0 POST-MENOPAUSAL: ICD-10-CM

## 2025-05-06 NOTE — PROGRESS NOTES
Subjective:   April Brown is a 74 year old female who presents for a Subsequent Annual Wellness visit (Pt already had Initial Annual Wellness) and scheduled follow up of multiple significant but stable problems.     LOV 5/13/24.    Since, she has been doing well.    Denies complaints/concerns today.    History/Other:   Fall Risk Assessment:   She has been screened for Falls and is low risk.      Cognitive Assessment:   She had a completely normal cognitive assessment - see flowsheet entries     Functional Ability/Status:   April Brown has some abnormal functions as listed below:  She has Vision problems based on screening of functional status.     Depression Screening (PHQ):  PHQ-2 SCORE: 0  , done 5/6/2025     5 minutes spent screening and counseling for depression    Advanced Directives:   She does have a Living Will but we do NOT have it on file in Epic.    She does have a POA but we do NOT have it on file in Epic.    Discussed Advance Care Planning with patient (and family/surrogate if present). Standard forms made available to patient in After Visit Summary.      Problem List[1]  Allergies:  She is allergic to adapalene.    Current Medications:  Active Meds, Sig Only[2]    Medical History:  She  has a past medical history of Allergic rhinitis, Breast cancer (HCC) (1997), Diverticulosis (2011), Environmental allergies, Essential hypertension, Exposure to medical diagnostic radiation, Frequent headaches (2002), Hemoptysis (2012), Hemoptysis (2012 and 2018), High blood pressure, Hypertension, essential (2006), Incontinence, Internal hemorrhoids (2003), Microscopic hematuria (2009), Osteoarthritis, Personal history of antineoplastic chemotherapy, Right knee DJD (2017), Visual impairment, and Vitamin D deficiency (2013).  Surgical History:  She  has a past surgical history that includes electrocardiogram, complete (04/10/2012); hysteroscopy (1998); d & c (1998); colonoscopy (12/2011); lumpectomy left (1997);  chemotherapy (); radiation left (); colonoscopy (2019); dilation/curettage,diagnostic; elbow surgery (Left, 2020); cataract; and  ().   Family History:  Her family history includes Alcohol and Other Disorders Associated in her father; Breast Cancer in her self; Cancer in her brother and father; Cancer (age of onset: 61) in her brother; Dementia (age of onset: 77) in her brother; Depression in her father; Heart Disorder in her mother; Other in her brother.  Social History:  She  reports that she has never smoked. She has been exposed to tobacco smoke. She has never used smokeless tobacco. She reports current alcohol use of about 1.0 standard drink of alcohol per week. She reports that she does not use drugs.    Tobacco:  She has never smoked tobacco.    CAGE Alcohol Screen:   CAGE screening score of 0 on 2025, showing low risk of alcohol abuse.      Patient Care Team:  Bryanna Espinosa DO as PCP - General (Internal Medicine)  Benny Mckinnon MD (GASTROENTEROLOGY)  Zina Wong PT as Physical Therapist (Physical Therapy)    Review of Systems  GENERAL: feels well otherwise  SKIN: denies any unusual skin lesions  EYES: denies blurred vision or double vision  HEENT: denies nasal congestion, sinus pain or ST  LUNGS: denies shortness of breath with exertion  CARDIOVASCULAR: denies chest pain on exertion  GI: denies abdominal pain, denies heartburn  : denies dysuria, hematuria  MUSCULOSKELETAL: denies back pain  NEURO: denies headaches  PSYCHE: denies depression    Objective:   Physical Exam  General Appearance:  Alert, cooperative, no distress, appears stated age   Head:  Normocephalic, without obvious abnormality, atraumatic   Eyes:  PERRL, conjunctiva/corneas clear, EOM's intact both eyes   Ears:  Normal TM's and external ear canals, both ears   Nose: Nares normal, septum midline,mucosa normal, no drainage or sinus tenderness   Throat: Lips, mucosa, and tongue normal; teeth and  gums normal   Neck: Supple, symmetrical, trachea midline, no adenopathy;  thyroid: not enlarged, symmetric, no tenderness/mass/nodules; no carotid bruit or JVD   Breast:   No palpable masses, skin dimpling, nipple inversion, or axillary LAD b/l   Lungs:   Clear to auscultation bilaterally, respirations unlabored   Heart:  Regular rate and rhythm, S1 and S2 normal, no murmur, rub, or gallop   Abdomen:   Soft, non-tender, bowel sounds active all four quadrants,  no masses, no organomegaly   Pelvic: Deferred   Extremities: Extremities normal, atraumatic, no cyanosis or edema   Pulses: 2+ and symmetric   Skin: Skin color, texture, turgor normal, no rashes or lesions   Lymph nodes: Cervical, supraclavicular, and axillary nodes normal   Neurologic: Normal       /72   Pulse 78   Temp 97.8 °F (36.6 °C)   Ht 5' 5\" (1.651 m)   Wt 157 lb 3.2 oz (71.3 kg)   SpO2 99%   BMI 26.16 kg/m²  Estimated body mass index is 26.16 kg/m² as calculated from the following:    Height as of this encounter: 5' 5\" (1.651 m).    Weight as of this encounter: 157 lb 3.2 oz (71.3 kg).    Medicare Hearing Assessment:   Hearing Screening    Entry User: Natalya Murray RN  Screening Method: Whisper Test  Whisper Test Result: Pass               Assessment & Plan:   April Brown is a 74 year old female who presents for a Medicare Assessment.     Medicare annual wellness visit, subsequent (Primary)  -     CBC With Differential With Platelet; Future; Expected date: 05/06/2025  -     Comp Metabolic Panel (14); Future; Expected date: 05/06/2025  -     TSH W Reflex To Free T4; Future; Expected date: 05/06/2025  -     Lipid Panel; Future; Expected date: 05/06/2025  -     Urinalysis with Culture Reflex; Future; Expected date: 05/06/2025    Encounter for screening mammogram for malignant neoplasm of breast  -     [facility] KATT 2D+3D SCREENING BILAT (CPT=77067/84275); Future; Expected date: 05/06/2025    Post-menopausal  -     XR DEXA BONE DENSITOMETRY  (CPT=77080); Future; Expected date: 09/05/2025    Hypertension  - controlled on current regimen:  - maxzide 37.5/25 mg daily     HLD  The 10-year ASCVD risk score (Sirisha ALCARAZ, et al., 2019) is: 15.2%    Values used to calculate the score:      Age: 74 years      Sex: Female      Is Non- : No      Diabetic: No      Tobacco smoker: No      Systolic Blood Pressure: 128 mmHg      Is BP treated: Yes      HDL Cholesterol: 56 mg/dL      Total Cholesterol: 185 mg/dL  - healthy diet/exercise, calcium score recommended    R knee DJD  - 12/19/17-xray R knee - narrowing medial joint space and small joint effusion.   - Had MRI per Dr. Waters 2020 showed meniscal tear.     Urinary incontinence  - pelvic floor therapy ordered as above     Hx hemoptysis, for years  - 2012, 10/2018 and 2020. By hx is from nasopharynx.   - CXR 10/31/18-ok--declined repeat CXR..  flex laryngoscopy 11/1/18 Dr. Tanner- irritation in nasopharynx.   - Repeat laryngoscopy 8/10/19 Dr. Moore, erythema larynx.   - Was to RTC, declines to return  - Resolved per pt at 11/22/22, 11/6/23 visits     Osteopenia  - calcium/vitamin D/weight bearing activities recommended  - repeat DEXA due 9/2025     History L breast cancer     Healthcare Maintenance  Cancer Screenings:  - Breast: mammogram birads 1 8/19/24, repeat ordered today  - Cervical: GYN per Dr. Chery  - Colon: colonoscopy 3/5/19 w/Dr. Mckinnon: diverticulosis and hemorrhoids. Next in 2029     Vaccines:  - Tdap: 10/24/13, repeat ordered today  - Shingles: zostavax 8/2014, shingrix recommended  - Pneumonia: prevnar 4/3/18, pneumovax 11/15/19, prevnar 20   - Flu: recommend yearly  - COVID-19: x5     Misc:  - DEXA: 9/5/23 w/osteopenia  - Advanced directives: discussed, patient has HCPOA, states she will bring it in     Goes to [place] Dec to May. PCP there is Dr. Mendiola in [place], [place].     RTC in 1 year or sooner PRN (plans to see PCP in [place]).    Overall 60 minutes was spent on this  encounter. 45 minutes spent reviewing, providing care coordination, and documentation of the acute/chronic conditions as listed above. 15 minutes was spent reviewing elements of a AWV and providing age appropriate screenings.     The patient indicates understanding of these issues and agrees to the plan.  Reinforced healthy diet, lifestyle, and exercise.      No follow-ups on file.     Bryanna Espinosa DO, 5/6/2025     Supplementary Documentation:   General Health:  In the past six months, have you lost more than 10 pounds without trying?: 2 - No  Has your appetite been poor?: No  Type of Diet: Balanced  How does the patient maintain a good energy level?: Appropriate Exercise, Daily Walks, Stretching  How would you describe your daily physical activity?: Moderate  How would you describe your current health state?: Good  How do you maintain positive mental well-being?: Social Interaction, Puzzles, Visiting Friends, Visiting Family  On a scale of 0 to 10, with 0 being no pain and 10 being severe pain, what is your pain level?: 0 - (None)  In the past six months, have you experienced urine leakage?: 1-Yes  At any time do you feel concerned for the safety/well-being of yourself and/or your children, in your home or elsewhere?: No  Have you had any immunizations at another office such as Influenza, Hepatitis B, Tetanus, or Pneumococcal?: No    Health Maintenance   Topic Date Due    Zoster Vaccines (2 of 3) 09/26/2014    COVID-19 Vaccine (6 - 2024-25 season) 09/01/2024    Annual Well Visit  01/01/2025    Annual Depression Screening  01/01/2025    Mammogram  08/19/2025    Colorectal Cancer Screening  03/05/2029    Influenza Vaccine  Completed    DEXA Scan  Completed    Fall Risk Screening (Annual)  Completed    Pneumococcal Vaccine: 50+ Years  Completed    Meningococcal B Vaccine  Aged Out            [1]   Patient Active Problem List  Diagnosis    Essential hypertension    History of breast cancer    Primary  osteoarthritis of right knee    Osteopenia   [2]   Outpatient Medications Marked as Taking for the 5/6/25 encounter (Office Visit) with Bryanna Espinosa DO   Medication Sig    Triamterene-HCTZ 37.5-25 MG Oral Tab TAKE 1 TABLET BY MOUTH DAILY    Multiple Vitamin (MULTI-VITAMIN DAILY) Oral Tab Take 1 tablet by mouth daily.    Omega-3 Fatty Acids (FISH OIL OR) Take 1 capsule by mouth daily.    Calcium Carbonate-Vitamin D (CALCIUM + D OR) Take 1 tablet by mouth daily.

## 2025-05-21 ENCOUNTER — TELEPHONE (OUTPATIENT)
Dept: INTERNAL MEDICINE CLINIC | Facility: CLINIC | Age: 75
End: 2025-05-21

## 2025-05-21 NOTE — TELEPHONE ENCOUNTER
Patient called to ensure labs and urinalysis were released by doctor so she can get work done in early fall.

## 2025-07-17 NOTE — TELEPHONE ENCOUNTER
Erin faxed refill request for Triamterene 37.5-Hydrochlorothiazide 25 mg tabs    Take 1 tablet by mouth daily    QTY  90   Last filled 4/14/25.  Pt. Has upcoming appt. With Dr. Mendoza.

## 2025-07-22 RX ORDER — TRIAMTERENE AND HYDROCHLOROTHIAZIDE 37.5; 25 MG/1; MG/1
1 TABLET ORAL DAILY
Qty: 90 TABLET | Refills: 0 | Status: SHIPPED | OUTPATIENT
Start: 2025-07-22

## 2025-07-22 NOTE — TELEPHONE ENCOUNTER
Refill passes per [facility] protocol.    Future Appointments   Date Time Provider Department Center   9/29/2025  1:00 PM Benny Mendoza DO [facility] EVELYN Ricks

## 2025-08-20 ENCOUNTER — HOSPITAL ENCOUNTER (OUTPATIENT)
Dept: MAMMOGRAPHY | Facility: HOSPITAL | Age: 75
Discharge: HOME | End: 2025-08-20
Attending: INTERNAL MEDICINE

## 2025-08-20 DIAGNOSIS — Z12.31 ENCOUNTER FOR SCREENING MAMMOGRAM FOR MALIGNANT NEOPLASM OF BREAST: ICD-10-CM

## 2025-08-20 PROCEDURE — 77063 BREAST TOMOSYNTHESIS BI: CPT | Performed by: INTERNAL MEDICINE

## 2025-08-20 PROCEDURE — 77067 SCR MAMMO BI INCL CAD: CPT | Performed by: INTERNAL MEDICINE

## 2025-08-21 ENCOUNTER — RESULTS FOLLOW-UP (OUTPATIENT)
Dept: INTERNAL MEDICINE CLINIC | Facility: CLINIC | Age: 75
End: 2025-08-21

## (undated) DEVICE — C-ARM: Brand: UNBRANDED

## (undated) DEVICE — SUCTION CANISTER, 3000CC,SAFELINER: Brand: DEROYAL

## (undated) DEVICE — UPPER EXTREMITY: Brand: MEDLINE INDUSTRIES, INC.

## (undated) DEVICE — SPLINT PRECUT ORTHOGLASS 4X15

## (undated) DEVICE — SUTURE VICRYL 0 CP-1

## (undated) DEVICE — TRAY SKIN PREP PVP-1

## (undated) DEVICE — COTTON UNDERCAST PADDING,REGULAR FINISH: Brand: WEBRIL

## (undated) DEVICE — STERILE TETRA-FLEX CF, ELASTIC BANDAGE, 4" X 5.5YD: Brand: TETRA-FLEX™CF

## (undated) DEVICE — DISPOSABLE TOURNIQUET CUFF DUAL BLADDER, DUAL PORT AND QUICK CONNECT CONNECTOR: Brand: COLOR CUFF

## (undated) DEVICE — ZIMMER® STERILE DISPOSABLE TOURNIQUET CUFF WITH PLC, DUAL PORT, SINGLE BLADDER, 24 IN. (61 CM)

## (undated) DEVICE — 3M™ IOBAN™ 2 ANTIMICROBIAL INCISE DRAPE 6640EZ: Brand: IOBAN™ 2

## (undated) DEVICE — SUTURE VICRYL 2-0 FS-1

## (undated) DEVICE — PROXIMATE SKIN STAPLERS (35 WIDE) CONTAINS 35 STAINLESS STEEL STAPLES (FIXED HEAD): Brand: PROXIMATE

## (undated) DEVICE — ENCORE® LATEX MICRO SIZE 7.5, STERILE LATEX POWDER-FREE SURGICAL GLOVE: Brand: ENCORE

## (undated) DEVICE — ESMARK: Brand: DEROYAL

## (undated) DEVICE — OCCLUSIVE GAUZE STRIP,3% BISMUTH TRIBROMOPHENATE IN PETROLATUM BLEND: Brand: XEROFORM

## (undated) DEVICE — 3M™ IOBAN™ 2 ANTIMICROBIAL INCISE DRAPE 6650EZ: Brand: IOBAN™ 2

## (undated) DEVICE — COVER SGL STRL LGHT HNDL BLU

## (undated) DEVICE — SPLINT PLASTER 4

## (undated) DEVICE — SUTURE ETHILON 3-0 669H

## (undated) DEVICE — CASSETTE DRAPE: Brand: UNBRANDED

## (undated) DEVICE — REM POLYHESIVE ADULT PATIENT RETURN ELECTRODE: Brand: VALLEYLAB

## (undated) DEVICE — SPECIALTY ARM SLING: Brand: DEROYAL

## (undated) DEVICE — SOL  .9 1000ML BTL

## (undated) DEVICE — GAMMEX® PI HYBRID SIZE 6.5, STERILE POWDER-FREE SURGICAL GLOVE, POLYISOPRENE AND NEOPRENE BLEND: Brand: GAMMEX

## (undated) DEVICE — ENCORE® LATEX MICRO SIZE 8.5, STERILE LATEX POWDER-FREE SURGICAL GLOVE: Brand: ENCORE

## (undated) DEVICE — 3M™ STERI-STRIP™ REINFORCED ADHESIVE SKIN CLOSURES, R1547, 1/2 IN X 4 IN (12 MM X 100 MM), 6 STRIPS/ENVELOPE: Brand: 3M™ STERI-STRIP™

## (undated) DEVICE — DISPOSABLE TOURNIQUET CUFF SINGLE BLADDER, DUAL PORT AND QUICK CONNECT CONNECTOR: Brand: COLOR CUFF

## (undated) NOTE — MR AVS SNAPSHOT
[place]  [facility] [address] [place] [address]  [phone]               Thank you for choosing us for your health care visit with Ronnie Melgoza MD.  We are glad to serve you and happy to provide you with this summary of your visit.   [address] [facility]  Diagnostics Main [facility] Parking) (Yellow Parking)  [address]   [address] [facility],  Sherine Galvez, [address] [facility]  [address]  [address] Visit [employer]  You can access your MyChart to more actively manage your health care and view more details from this visit by going to [email or web address].   If you've recently had a stay at the Hospital you can access your discharge instructions i track your progress   You don’t need to join a gym. Home exercises work great.  Put more priority on exercise in your life                    Visit [facility] online at  [email or web address]

## (undated) NOTE — LETTER
Patient Name: April Brown  YOB: 1950          MRN :  [id number]  Date:  9/11/2024  Referring Physician:  Bryanna Espinosa    Discharge Summary  Pt has attended 7 visits in Physical Therapy.     Diagnosis:    Urinary incontinence, unspecified type (R32)   Referring Provider: Bryanna Espinosa  Date of Evaluation:    7/30/2024    Precautions:  None Next MD visit:   none scheduled  Date of Surgery: n/a   Insurance Primary/Secondary: [employer] [facility] / N/A     # Auth Visits: latesha BEATTY thru 10/28/2024            Subjective: Pt reports no leaking this week, feels like hips are getting stronger. Rode bike, walked, ran this week. Feels like she is ready to keep working on things on her own now.     Pain: 0/10      Objective: see chart below      Assessment: Pt has met all PT goals at this time. Pt's leakage improvements have maintained over the past week, pt has had no episodes of DASHA over the past 2 weeks, while maintaining regular activity levels. Pt is appropriate for and agreeable to discharge to HEP and indep management of symptoms at this time.      Goals: (to be met in 10 visits)  Patient instructed on bladder irritants, increased water intake to 64 ounces /day (GOAL MET)     Patient to report urinary frequency to every 2-4 hours to allow for independent ADLs (GOAL MET)  Patient reports a reduction in DASHA from 3+x/ day to 0-1x/ day resulting in no need for pad use. (GOAL MET)    Patient is able to perform Levator Ani contraction inverse to diaphragmatic breathing to allow for pelvic brace with ADLs without valsalva. (GOAL MET)     Patient reports change in King stool to #4 with daily bowel moment without straining for improved bowel function.  (GOAL MET)     Patient understands importance of performing HEP to prevent reoccurrence of symptoms. (GOAL MET)    Pt with B hip strength 5/5 in order to decreased the demand on pelvic floor muscles with functional squat and lifting a load from ground level.  (GOAL  MET)    Plan: discharge  Date:  8/28/24               TX#: 5 Date: 9/4/24  Tx#: 6 Date: 9/11/24  Tx#: 7   Therex: 30 min  Reviewed HEP  Stretch strap: prone quad stretch R/L x30\", hamstrings R/L x30\", hip abd x30\", hip add x30\"  Calf stretch L 2 x1 min  Shuttle +exhale+kegel DL 7c x10, SL R/L 7c x10  Step-ups 6\" with kegel, lateral x6 ea Therex: 43 min  Green stretch strap, prone quads, supine hamstring, ITB, hip add, x30 sec ea on R/L  Pilates ring bridge with 1/4 kegel + exhale, hip add x10, hip abd x10  SL bridge x10  Fig 4 bridge x10  Clams R/L x10, with UE ball press for TA activtation x10  Reverse clams R/L x10  Sit>stand with exhale + kegel x5  Squat to target with breath sequence x5, with 1/4 kegel x5 (to high target, standard chair height target)  Squat with 10lb db chest height with 1/4 kegel + exhale x5  Lunge with 1/4 kegel + exhale R/L x5 ea Therex: 38 min  Green stretch strap, prone quads, supine hamstring, ITB, hip add, x30 sec ea on R/L  Pilates ring bridge with 1/4 kegel + exhale, hip add x10, hip abd x10  SL bridge x10  Supine fig 4 stretch x10  Clams yellow TB R/L x10  Reverse clams yellow TB R/L x10  Squat with 15lb db chest height with 1/4 kegel + exhale x5  Lunge with 1/4 kegel + exhale R/L x10ea   Theract: 10 min  Pt ed on anatomy and impact foot/ankle and of PFM on shock absorption with running and exercises with higher impact               HEP: Access Code: [email or web address]  URL: [email or web address]  Date: 09/04/2024  Prepared by: Zina Grajedaod  Exercises  - Seated Piriformis Stretch with Trunk Bend  - 1 x daily - 7 x weekly - 1 sets - 3 reps - 30 sec hold  - Seated Hamstring Stretch  - 1 x daily - 7 x weekly - 1 sets - 3 reps - 30 sec hold  - Supine Bridge with Mini Swiss Ball Between Knees  - 1 x daily - 7 x weekly - 1 sets - 10 reps  - Bridge with Hip Abduction and Resistance  - 1 x daily - 7 x weekly - 1 sets - 10 reps  - Single Leg Bridge  - 1 x daily - 7 x weekly - 3 sets -  10 reps  - Clamshell  - 1 x daily - 7 x weekly - 1 sets - 10 reps  - Sidelying Reverse Clamshell  - 1 x daily - 7 x weekly - 1 sets - 10 reps  - Lateral Step Up  - 1 x daily - 3 x weekly - 1-2 sets - 10 reps  - Squat with Chair Touch  - 1 x daily - 3 x weekly - 1 sets - 10 reps  - Static Lunge  - 1 x daily - 3 x weekly - 1 sets - 10 reps  - Prone Quad Stretch with Towel Roll and Strap  - 1 x daily - 7 x weekly - 1 sets - 3 reps - 30 sec hold  - Prone Quadriceps Stretch with Strap  - 1 x daily - 7 x weekly - 1 sets - 3 reps - 30 sec hold  - Supine Hamstring Stretch with Strap  - 1 x daily - 7 x weekly - 1 sets - 3 reps - 30 sec hold  - Supine ITB Stretch with Strap  - 1 x daily - 7 x weekly - 1 sets - 3 reps - 30 sec hold  - Hip Adductors and Hamstring Stretch with Strap  - 1 x daily - 7 x weekly - 1 sets - 3 reps - 30 sec hold    Charges: 3 TE       Total Timed Treatment: 38 min  Total Treatment Time: 38 min      Plan: discharge      Thank you for your referral. If you have any questions, please contact me at Dept: [phone].    Sincerely,  Electronically signed by therapist: Zina Wong PT     Physician's certification required:  No                  21st Century Cures Act Notice to Patient: Medical documents like this are made available to patients in the interest of transparency. However, be advised this is a medical document and it is intended as [id number] communication between your medical providers. This medical document may contain abbreviations, assessments, medical data, and results or other terms that are unfamiliar. Medical documents are intended to carry relevant information, facts as evident, and the clinical opinion of the practitioner. As such, this medical document may be written in language that appears blunt or direct. You are encouraged to contact your medical provider and/or [facility] Patient Experience if you have any questions about this medical document.

## (undated) NOTE — LETTER
Josh Woo, [address] [facility], [address]       11/01/18        Patient: Michaela Grullon   YOB: 1950   Date of Visit: 11/1/2018       Dear  Dr. Gonzales Orosco MD,      Thank you for referring Michaela Grullon to my pr